# Patient Record
Sex: MALE | Race: BLACK OR AFRICAN AMERICAN | Employment: OTHER | URBAN - METROPOLITAN AREA
[De-identification: names, ages, dates, MRNs, and addresses within clinical notes are randomized per-mention and may not be internally consistent; named-entity substitution may affect disease eponyms.]

---

## 2017-03-31 ENCOUNTER — HOME HEALTH ADMISSION (OUTPATIENT)
Dept: HOME HEALTH SERVICES | Facility: HOME HEALTH | Age: 82
End: 2017-03-31

## 2017-04-01 ENCOUNTER — HOSPITAL ENCOUNTER (OUTPATIENT)
Dept: CT IMAGING | Age: 82
Discharge: HOME | End: 2017-04-01
Attending: INTERNAL MEDICINE
Payer: MEDICARE

## 2017-04-01 DIAGNOSIS — R27.0 ATAXIA: ICD-10-CM

## 2017-04-01 PROCEDURE — 70450 CT HEAD/BRAIN W/O DYE: CPT

## 2017-04-06 ENCOUNTER — HOSPITAL ENCOUNTER (OUTPATIENT)
Dept: GENERAL RADIOLOGY | Age: 82
Discharge: HOME | End: 2017-04-06
Attending: INTERNAL MEDICINE
Payer: MEDICARE

## 2017-04-06 DIAGNOSIS — R63.4 WEIGHT LOSS: ICD-10-CM

## 2017-04-06 PROCEDURE — 71020 XR CHEST PA LAT: CPT

## 2017-05-10 ENCOUNTER — HOSPITAL ENCOUNTER (INPATIENT)
Age: 82
LOS: 8 days | Discharge: NURSING, REHAB OR LONG-TERM CARE | DRG: 682 | End: 2017-05-18
Attending: EMERGENCY MEDICINE | Admitting: INTERNAL MEDICINE
Payer: MEDICARE

## 2017-05-10 ENCOUNTER — APPOINTMENT (OUTPATIENT)
Dept: GENERAL RADIOLOGY | Age: 82
DRG: 682 | End: 2017-05-10
Attending: EMERGENCY MEDICINE
Payer: MEDICARE

## 2017-05-10 ENCOUNTER — APPOINTMENT (OUTPATIENT)
Dept: CT IMAGING | Age: 82
DRG: 682 | End: 2017-05-10
Attending: EMERGENCY MEDICINE
Payer: MEDICARE

## 2017-05-10 ENCOUNTER — APPOINTMENT (OUTPATIENT)
Dept: ULTRASOUND IMAGING | Age: 82
DRG: 682 | End: 2017-05-10
Attending: INTERNAL MEDICINE
Payer: MEDICARE

## 2017-05-10 DIAGNOSIS — R41.82 MENTAL STATUS, DECREASED: ICD-10-CM

## 2017-05-10 DIAGNOSIS — N28.9 ACUTE RENAL INSUFFICIENCY: ICD-10-CM

## 2017-05-10 DIAGNOSIS — E86.0 DEHYDRATION: ICD-10-CM

## 2017-05-10 DIAGNOSIS — N39.0 ACUTE UTI: Primary | ICD-10-CM

## 2017-05-10 LAB
ALBUMIN SERPL BCP-MCNC: 3.4 G/DL (ref 3.4–5)
ALBUMIN/GLOB SERPL: 0.9 {RATIO} (ref 0.8–1.7)
ALP SERPL-CCNC: 77 U/L (ref 45–117)
ALT SERPL-CCNC: 13 U/L (ref 16–61)
ANION GAP BLD CALC-SCNC: 7 MMOL/L (ref 3–18)
APPEARANCE UR: ABNORMAL
APTT PPP: 28.9 SEC (ref 23–36.4)
AST SERPL W P-5'-P-CCNC: 19 U/L (ref 15–37)
BACTERIA URNS QL MICRO: ABNORMAL /HPF
BASOPHILS # BLD AUTO: 0 K/UL (ref 0–0.06)
BASOPHILS # BLD: 0 % (ref 0–2)
BILIRUB SERPL-MCNC: 1.4 MG/DL (ref 0.2–1)
BILIRUB UR QL: ABNORMAL
BUN SERPL-MCNC: 23 MG/DL (ref 7–18)
BUN/CREAT SERPL: 12 (ref 12–20)
CALCIUM SERPL-MCNC: 9.2 MG/DL (ref 8.5–10.1)
CHLORIDE SERPL-SCNC: 106 MMOL/L (ref 100–108)
CK MB CFR SERPL CALC: 1.8 % (ref 0–4)
CK MB SERPL-MCNC: 1.8 NG/ML (ref 5–25)
CK SERPL-CCNC: 99 U/L (ref 39–308)
CO2 SERPL-SCNC: 31 MMOL/L (ref 21–32)
COLOR UR: ABNORMAL
CREAT SERPL-MCNC: 1.97 MG/DL (ref 0.6–1.3)
DIFFERENTIAL METHOD BLD: ABNORMAL
EOSINOPHIL # BLD: 0 K/UL (ref 0–0.4)
EOSINOPHIL NFR BLD: 1 % (ref 0–5)
EPITH CASTS URNS QL MICRO: ABNORMAL /LPF (ref 0–5)
ERYTHROCYTE [DISTWIDTH] IN BLOOD BY AUTOMATED COUNT: 14.2 % (ref 11.6–14.5)
GLOBULIN SER CALC-MCNC: 3.7 G/DL (ref 2–4)
GLUCOSE SERPL-MCNC: 109 MG/DL (ref 74–99)
GLUCOSE UR STRIP.AUTO-MCNC: NEGATIVE MG/DL
HCT VFR BLD AUTO: 39 % (ref 36–48)
HGB BLD-MCNC: 12.6 G/DL (ref 13–16)
HGB UR QL STRIP: ABNORMAL
INR PPP: 2 (ref 0.8–1.2)
KETONES UR QL STRIP.AUTO: ABNORMAL MG/DL
LACTATE BLD-SCNC: 2.4 MMOL/L (ref 0.4–2)
LEUKOCYTE ESTERASE UR QL STRIP.AUTO: ABNORMAL
LYMPHOCYTES # BLD AUTO: 17 % (ref 21–52)
LYMPHOCYTES # BLD: 0.9 K/UL (ref 0.9–3.6)
MCH RBC QN AUTO: 31.4 PG (ref 24–34)
MCHC RBC AUTO-ENTMCNC: 32.3 G/DL (ref 31–37)
MCV RBC AUTO: 97.3 FL (ref 74–97)
MONOCYTES # BLD: 0.5 K/UL (ref 0.05–1.2)
MONOCYTES NFR BLD AUTO: 10 % (ref 3–10)
NEUTS SEG # BLD: 3.9 K/UL (ref 1.8–8)
NEUTS SEG NFR BLD AUTO: 72 % (ref 40–73)
NITRITE UR QL STRIP.AUTO: NEGATIVE
PH UR STRIP: 5 [PH] (ref 5–8)
PLATELET # BLD AUTO: 125 K/UL (ref 135–420)
PMV BLD AUTO: 11.5 FL (ref 9.2–11.8)
POTASSIUM SERPL-SCNC: 3.3 MMOL/L (ref 3.5–5.5)
PROT SERPL-MCNC: 7.1 G/DL (ref 6.4–8.2)
PROT UR STRIP-MCNC: 30 MG/DL
PROTHROMBIN TIME: 21.8 SEC (ref 11.5–15.2)
RBC # BLD AUTO: 4.01 M/UL (ref 4.7–5.5)
RBC #/AREA URNS HPF: >100 /HPF (ref 0–5)
SODIUM SERPL-SCNC: 144 MMOL/L (ref 136–145)
SP GR UR REFRACTOMETRY: 1.02 (ref 1–1.03)
TROPONIN I SERPL-MCNC: 0.04 NG/ML (ref 0–0.04)
UROBILINOGEN UR QL STRIP.AUTO: 2 EU/DL (ref 0.2–1)
WBC # BLD AUTO: 5.4 K/UL (ref 4.6–13.2)
WBC URNS QL MICRO: ABNORMAL /HPF (ref 0–4)

## 2017-05-10 PROCEDURE — 80053 COMPREHEN METABOLIC PANEL: CPT | Performed by: EMERGENCY MEDICINE

## 2017-05-10 PROCEDURE — 85610 PROTHROMBIN TIME: CPT | Performed by: EMERGENCY MEDICINE

## 2017-05-10 PROCEDURE — 71010 XR CHEST PORT: CPT

## 2017-05-10 PROCEDURE — 74011000250 HC RX REV CODE- 250: Performed by: INTERNAL MEDICINE

## 2017-05-10 PROCEDURE — 65270000029 HC RM PRIVATE

## 2017-05-10 PROCEDURE — 74011250636 HC RX REV CODE- 250/636: Performed by: EMERGENCY MEDICINE

## 2017-05-10 PROCEDURE — 51701 INSERT BLADDER CATHETER: CPT

## 2017-05-10 PROCEDURE — 76770 US EXAM ABDO BACK WALL COMP: CPT

## 2017-05-10 PROCEDURE — 85025 COMPLETE CBC W/AUTO DIFF WBC: CPT | Performed by: EMERGENCY MEDICINE

## 2017-05-10 PROCEDURE — 74011250636 HC RX REV CODE- 250/636: Performed by: INTERNAL MEDICINE

## 2017-05-10 PROCEDURE — 96374 THER/PROPH/DIAG INJ IV PUSH: CPT

## 2017-05-10 PROCEDURE — 99284 EMERGENCY DEPT VISIT MOD MDM: CPT

## 2017-05-10 PROCEDURE — 96361 HYDRATE IV INFUSION ADD-ON: CPT

## 2017-05-10 PROCEDURE — 70450 CT HEAD/BRAIN W/O DYE: CPT

## 2017-05-10 PROCEDURE — 81001 URINALYSIS AUTO W/SCOPE: CPT | Performed by: EMERGENCY MEDICINE

## 2017-05-10 PROCEDURE — 82553 CREATINE MB FRACTION: CPT | Performed by: EMERGENCY MEDICINE

## 2017-05-10 PROCEDURE — 77030011943

## 2017-05-10 PROCEDURE — 73030 X-RAY EXAM OF SHOULDER: CPT

## 2017-05-10 PROCEDURE — 83605 ASSAY OF LACTIC ACID: CPT

## 2017-05-10 PROCEDURE — 74011250637 HC RX REV CODE- 250/637: Performed by: INTERNAL MEDICINE

## 2017-05-10 PROCEDURE — 85730 THROMBOPLASTIN TIME PARTIAL: CPT | Performed by: EMERGENCY MEDICINE

## 2017-05-10 PROCEDURE — 87086 URINE CULTURE/COLONY COUNT: CPT | Performed by: EMERGENCY MEDICINE

## 2017-05-10 RX ORDER — CEFTRIAXONE 1 G/1
1 INJECTION, POWDER, FOR SOLUTION INTRAMUSCULAR; INTRAVENOUS
Status: COMPLETED | OUTPATIENT
Start: 2017-05-10 | End: 2017-05-10

## 2017-05-10 RX ORDER — SODIUM CHLORIDE 9 MG/ML
100 INJECTION, SOLUTION INTRAVENOUS CONTINUOUS
Status: DISCONTINUED | OUTPATIENT
Start: 2017-05-10 | End: 2017-05-10

## 2017-05-10 RX ORDER — CARBOXYMETHYLCELLULOSE SODIUM 5 MG/ML
1 SOLUTION/ DROPS OPHTHALMIC DAILY
Status: DISCONTINUED | OUTPATIENT
Start: 2017-05-11 | End: 2017-05-18 | Stop reason: HOSPADM

## 2017-05-10 RX ORDER — POLYETHYLENE GLYCOL 3350 17 G/17G
17 POWDER, FOR SOLUTION ORAL DAILY
Status: DISCONTINUED | OUTPATIENT
Start: 2017-05-11 | End: 2017-05-17

## 2017-05-10 RX ORDER — POTASSIUM CHLORIDE AND SODIUM CHLORIDE 900; 300 MG/100ML; MG/100ML
INJECTION, SOLUTION INTRAVENOUS CONTINUOUS
Status: DISCONTINUED | OUTPATIENT
Start: 2017-05-10 | End: 2017-05-13

## 2017-05-10 RX ORDER — DOCUSATE SODIUM 100 MG/1
100 CAPSULE, LIQUID FILLED ORAL 2 TIMES DAILY
Status: DISCONTINUED | OUTPATIENT
Start: 2017-05-10 | End: 2017-05-17

## 2017-05-10 RX ORDER — WARFARIN SODIUM 5 MG/1
5 TABLET ORAL
Status: DISCONTINUED | OUTPATIENT
Start: 2017-05-10 | End: 2017-05-13

## 2017-05-10 RX ORDER — PANTOPRAZOLE SODIUM 40 MG/1
40 TABLET, DELAYED RELEASE ORAL
Status: DISCONTINUED | OUTPATIENT
Start: 2017-05-11 | End: 2017-05-17

## 2017-05-10 RX ORDER — ASPIRIN 81 MG/1
81 TABLET ORAL DAILY
Status: DISCONTINUED | OUTPATIENT
Start: 2017-05-11 | End: 2017-05-13

## 2017-05-10 RX ORDER — TERAZOSIN 5 MG/1
10 CAPSULE ORAL
Status: DISCONTINUED | OUTPATIENT
Start: 2017-05-10 | End: 2017-05-17

## 2017-05-10 RX ADMIN — WARFARIN SODIUM 5 MG: 5 TABLET ORAL at 21:53

## 2017-05-10 RX ADMIN — BRIMONIDINE TARTRATE 1 DROP: 1 SOLUTION/ DROPS OPHTHALMIC at 22:35

## 2017-05-10 RX ADMIN — CEFTRIAXONE SODIUM 1 G: 1 INJECTION, POWDER, FOR SOLUTION INTRAMUSCULAR; INTRAVENOUS at 16:50

## 2017-05-10 RX ADMIN — SODIUM CHLORIDE 1000 ML: 900 INJECTION, SOLUTION INTRAVENOUS at 16:50

## 2017-05-10 RX ADMIN — SODIUM CHLORIDE AND POTASSIUM CHLORIDE: 9; 2.98 INJECTION, SOLUTION INTRAVENOUS at 19:27

## 2017-05-10 RX ADMIN — TERAZOSIN HYDROCHLORIDE 10 MG: 5 CAPSULE ORAL at 21:55

## 2017-05-10 NOTE — H&P
[address]    Name:  Eva Levin  MR#:  [id number]  :  [id number]  Account #:  [de-identified]  Date of Adm:  05/10/2017      CHIEF COMPLAINT: Altered mental status. HISTORY OF PRESENT ILLNESS: This is a 61-year-old   American male with multiple medical problems relevant for underlying  dementia, chronic kidney disease, sick sinus syndrome, status post  pacemaker with paroxysmal atrial fibrillation on chronic  anticoagulation, among other problems, who was brought to the office  today by his daughter and caretaker because of significant decrease in  his mental status. Apparently, the patient has not been able to get out  of bed, he has been minimally responsive. The patient has had  constipation for a week, finally had a bowel movement the night before  admission. No fever, no chills, no cough, no abdominal pain. He has  been getting his medications and eating some. In the office, the patient  was minimally responsive and felt to need further evaluation. He was  sent to the ER. The patient was found to be dehydrated with acute  kidney injury, urinary tract infection and he was referred for admission. PAST MEDICAL HISTORY  1. Coronary artery disease, status post coronary artery bypass graft. 2. Sick sinus syndrome, status post pacemaker. 3. Paroxysmal atrial fibrillation, on chronic anticoagulation. 4. Chronic kidney disease stage 2+. 5. Hypercholesterolemia. 6. B12 deficiency, recently started on injections. 7. Polyarticular osteoarthritis. 8. Gout. 9. Chronic stable thrombocytopenia. 10. History of gastric ulcers and GI bleed on proton pump inhibitors. 11. Mild chronic anemia. 12. Cardiomyopathy, status post AICD. 13. Benign prostatic hyperplasia. 14. Gastroesophageal reflux disease. ALLERGIES: HE IS ALLERGIC TO SEAFOOD. CURRENT MEDICATIONS  1. B12 1000 mcg IM once a month. 2. Lasix 40 mg daily. 3. Aldactone 25 mg daily.   4. Coumadin 5 mg tablets taken as directed. 5. Hytrin 10 mg at bedtime. 6. Baby aspirin 1 daily. 7. Stool softener daily. 8. Alphagan eye drops to the left eye as directed. 9. Systane/Refresh eyedrops as directed. 10. Norco 5/325 one every day as needed. 11. Colchicine 0.6 mg as needed for gout. 12. Prilosec 20 mg daily. SOCIAL HISTORY: The patient is . He and his wife live with  their daughter. He had 3 children, 1 is . He is a former  smoker and a social alcohol drinker. FAMILY HISTORY: Positive for hypertension. REVIEW OF SYSTEMS: The patient is unable to provide. According to  the daughter, decreased mental status, increased weakness. Right  shoulder pain after a fall recently. Constipation as above, otherwise  negative. PHYSICAL EXAMINATION  GENERAL: Elderly male in no acute distress, minimally responsive. VITAL SIGNS: In the ER, temperature 99, pulse 73, blood pressure  174/91. HEENT: Atraumatic, normocephalic. Sclerae anicteric. NECK: No venous distention or adenopathy. Mucous membranes dry. LUNGS: Equal air entry. Clear to auscultation. HEART: Regular rhythm by auscultation. Systolic ejection murmur 2/6  left parasternal border. ABDOMEN: Soft, nontender, nondistended. EXTREMITIES: No edema. NEUROLOGIC: As above, minimally responsive. LABORATORY DATA: CBC: WBC 5.4, hemoglobin and hematocrit  12.6 and 39.0. Urinalysis: Large blood, moderate bilirubin, small  leukocyte esterase, few epithelial cells, 4-10 WBCs, greater than 100  RBCs and bacteria 3+. Chemistry: Potassium 3.3, BUN 23, creatinine  1.97. Bilirubin 1.4. Cardiac enzymes negative. CT of the head with no  acute abnormality and right shoulder x-ray without acute problems. IMPRESSION  1. Altered mental status. 2. Dehydration. 3. Acute kidney injury. 4. Possible urinary tract infection. 5. Hypokalemia. 6. B12 deficiency. 7. Underlying dementia. 8. As per past medical history.     PLAN: Cultures have been obtained, will obtain renal ultrasound. IV  Rocephin started. Resume usual medications. IV fluids for hydration  and monitor urine output, renal function, etc. Monitor bilirubin, consider  hepatic imaging. Resume all other medications and monitor PT/INR. Further management accordingly.         Nicolle Argueta MD    NT / [facility]  D:  05/10/2017   17:43  T:  05/10/2017   19:38  Job #:  [id number]

## 2017-05-10 NOTE — ED TRIAGE NOTES
Per daughter, patient was walking and alert one month ago, has since become less alert and is no longer able to ambulate on his own.  Responds to pain, patient has upper extremity contractures after being diagnosed with gout about 6 months ago

## 2017-05-10 NOTE — ED PROVIDER NOTES
HPI Comments: 2:43 PM Julia Luke is a 80 y.o. male with history of HTN, CHF, renal insufficiency, and GERD who presents to the ED for evaluation of a fall which happened earlier today in Dr. Gian Miranda office. Patient's caretaker states pt rolled off the bed earlier and was suggested to visit the ED for evaluation. 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 PCP: Leodan Son MD      The history is provided by a relative and a caregiver. Past Medical History:   Diagnosis Date    Atrial fibrillation (HCC)     Benign prostate hyperplasia     Cellulitis     Lower extremity    Chronic renal insufficiency     Congestive heart failure (CHF) (HCC)     GERD (gastroesophageal reflux disease)     Glaucoma     Hypertension     Inguinal hernia     Klebsiella pneumonia (HCC)     Pleural effusion, left     Sepsis ([address]     Venous insufficiency     Lower extremity       Past Surgical History:   Procedure Laterality Date    CARDIAC SURG PROCEDURE UNLIST      HX PACEMAKER           History reviewed. No pertinent family history. Social History     Social History    Marital status:      Spouse name: N/A    Number of children: N/A    Years of education: N/A     Occupational History    Not on file. Social History Main Topics    Smoking status: Former Smoker    Smokeless tobacco: Not on file    Alcohol use Yes      Comment: occ    Drug use: No    Sexual activity: Not on file     Other Topics Concern    Not on file     Social History Narrative         ALLERGIES: Seafood    Review of Systems   Unable to perform ROS: Patient nonverbal       Vitals:    05/10/17 1509   BP: 137/85   Pulse: 70   Resp: 22   Temp: 99 °F (37.2 °C)   SpO2: 98%   Weight: 85.3 kg (188 lb)   Height: 6' (1.829 m)            Physical Exam   Constitutional: He is oriented to person, place, and time. He appears well-developed and well-nourished.  No distress. HENT:   Head: Normocephalic and atraumatic. Mouth/Throat: Oropharynx is clear and moist.   Eyes: Conjunctivae and EOM are normal. Pupils are equal, round, and reactive to light. No scleral icterus. Neck: Normal range of motion. Neck supple. Cardiovascular: Normal rate, regular rhythm and normal heart sounds. No murmur heard. Pacemaker left subclavian. Pulmonary/Chest: Effort normal and breath sounds normal. No respiratory distress. Abdominal: Soft. Bowel sounds are normal. He exhibits no distension. There is no tenderness. Genitourinary: Rectal exam shows guaiac negative stool. Musculoskeletal: He exhibits no edema. Mild contraction L elbow. Gouty changes R MCP. Lymphadenopathy:     He has no cervical adenopathy. Neurological: He is alert and oriented to person, place, and time. Coordination normal. GCS verbal subscore is 2. Skin: Skin is warm and dry. No rash noted. Psychiatric: He has a normal mood and affect. His behavior is normal.   Nursing note and vitals reviewed. MDM  Number of Diagnoses or Management Options  Acute renal insufficiency:   Acute UTI:   Dehydration:   Mental status, decreased:   Diagnosis management comments: Referred to ED for dehydration altered mental status and hypotension Bp improved with fluids labs reviewed ct neg for acute changes    ua cultures rocephin started    Discussed with DR Stewart Se will admit    r shoulder ?  Old fracture does not appear acute will await formal reading     ED Course       Procedures  Vitals:  Patient Vitals for the past 12 hrs:   Temp Pulse Resp BP SpO2   05/10/17 1509 99 °F (37.2 °C) 70 22 137/85 98 %        Medications ordered:   Medications   sodium chloride 0.9 % bolus infusion 1,000 mL (1,000 mL IntraVENous New Bag 5/10/17 1650)   cefTRIAXone (ROCEPHIN) injection 1 g (1 g IntraVENous Given 5/10/17 1650)         Lab findings:  Recent Results (from the past 12 hour(s))   URINALYSIS W/ RFLX MICROSCOPIC Collection Time: 05/10/17  3:05 PM   Result Value Ref Range    Color DARK YELLOW      Appearance CLOUDY      Specific gravity 1.020 1.005 - 1.030      pH (UA) 5.0 5.0 - 8.0      Protein 30 (A) NEG mg/dL    Glucose NEGATIVE  NEG mg/dL    Ketone TRACE (A) NEG mg/dL    Bilirubin MODERATE (A) NEG      Blood LARGE (A) NEG      Urobilinogen 2.0 (H) 0.2 - 1.0 EU/dL    Nitrites NEGATIVE  NEG      Leukocyte Esterase SMALL (A) NEG     URINE MICROSCOPIC ONLY    Collection Time: 05/10/17  3:05 PM   Result Value Ref Range    WBC 4 to 10 0 - 4 /hpf    RBC >100 (H) 0 - 5 /hpf    Epithelial cells FEW 0 - 5 /lpf    Bacteria 3+ (A) NEG /hpf   POC LACTIC ACID    Collection Time: 05/10/17  3:29 PM   Result Value Ref Range    Lactic Acid (POC) 2.4 (HH) 0.4 - 2.0 mmol/L   CBC WITH AUTOMATED DIFF    Collection Time: 05/10/17  3:30 PM   Result Value Ref Range    WBC 5.4 4.6 - 13.2 K/uL    RBC 4.01 (L) 4.70 - 5.50 M/uL    HGB 12.6 (L) 13.0 - 16.0 g/dL    HCT 39.0 36.0 - 48.0 %    MCV 97.3 (H) 74.0 - 97.0 FL    MCH 31.4 24.0 - 34.0 PG    MCHC 32.3 31.0 - 37.0 g/dL    RDW 14.2 11.6 - 14.5 %    PLATELET 791 (L) 975 - 420 K/uL    MPV 11.5 9.2 - 11.8 FL    NEUTROPHILS 72 40 - 73 %    LYMPHOCYTES 17 (L) 21 - 52 %    MONOCYTES 10 3 - 10 %    EOSINOPHILS 1 0 - 5 %    BASOPHILS 0 0 - 2 %    ABS. NEUTROPHILS 3.9 1.8 - 8.0 K/UL    ABS. LYMPHOCYTES 0.9 0.9 - 3.6 K/UL    ABS. MONOCYTES 0.5 0.05 - 1.2 K/UL    ABS. EOSINOPHILS 0.0 0.0 - 0.4 K/UL    ABS.  BASOPHILS 0.0 0.0 - 0.06 K/UL    DF AUTOMATED     METABOLIC PANEL, COMPREHENSIVE    Collection Time: 05/10/17  3:30 PM   Result Value Ref Range    Sodium 144 136 - 145 mmol/L    Potassium 3.3 (L) 3.5 - 5.5 mmol/L    Chloride 106 100 - 108 mmol/L    CO2 31 21 - 32 mmol/L    Anion gap 7 3.0 - 18 mmol/L    Glucose 109 (H) 74 - 99 mg/dL    BUN 23 (H) 7.0 - 18 MG/DL    Creatinine 1.97 (H) 0.6 - 1.3 MG/DL    BUN/Creatinine ratio 12 12 - 20      GFR est AA 39 (L) >60 ml/min/1.73m2    GFR est non-AA 32 (L) >60 ml/min/1.73m2    Calcium 9.2 8.5 - 10.1 MG/DL    Bilirubin, total 1.4 (H) 0.2 - 1.0 MG/DL    ALT (SGPT) 13 (L) 16 - 61 U/L    AST (SGOT) 19 15 - 37 U/L    Alk. phosphatase 77 45 - 117 U/L    Protein, total 7.1 6.4 - 8.2 g/dL    Albumin 3.4 3.4 - 5.0 g/dL    Globulin 3.7 2.0 - 4.0 g/dL    A-G Ratio 0.9 0.8 - 1.7     CARDIAC PANEL,(CK, CKMB & TROPONIN)    Collection Time: 05/10/17  3:30 PM   Result Value Ref Range    CK 99 39 - 308 U/L    CK - MB 1.8 <3.6 ng/ml    CK-MB Index 1.8 0.0 - 4.0 %    Troponin-I, Qt. 0.04 0.0 - 0.045 NG/ML   PTT    Collection Time: 05/10/17  3:30 PM   Result Value Ref Range    aPTT 28.9 23.0 - 36.4 SEC   PROTHROMBIN TIME + INR    Collection Time: 05/10/17  3:30 PM   Result Value Ref Range    Prothrombin time 21.8 (H) 11.5 - 15.2 sec    INR 2.0 (H) 0.8 - 1.2         EKG interpretation by ED Physician:       X-Ray, CT or other radiology findings or impressions:  CT HEAD WO CONT   Final Result      XR SHOULDER RT AP/LAT MIN 2 V   Final Result      XR CHEST PORT   Final Result          Orders:  Orders Placed This Encounter    CULTURE, URINE     Standing Status:   Standing     Number of Occurrences:   1     Order Specific Question:   Reason for Culture     Answer:   Eval of sepsis without a clear source    CT HEAD WO CONT     Standing Status:   Standing     Number of Occurrences:   1     Order Specific Question:   Transport     Answer:   Stretcher [5]     Order Specific Question:   Is Patient Allergic to Contrast Dye?      Answer:   No    XR SHOULDER RT AP/LAT MIN 2 V     Standing Status:   Standing     Number of Occurrences:   1     Order Specific Question:   Transport     Answer:   Stretcher [5]     Order Specific Question:   Reason for Exam     Answer:   trauma    XR CHEST PORT     Standing Status:   Standing     Number of Occurrences:   1     Order Specific Question:   Reason for Exam     Answer:   altered mental status    CBC WITH AUTOMATED DIFF     Standing Status:   Standing     Number of Occurrences:   1    METABOLIC PANEL, COMPREHENSIVE     Standing Status:   Standing     Number of Occurrences:   1    CARDIAC PANEL,(CK, CKMB & TROPONIN)     Standing Status:   Standing     Number of Occurrences:   1    URINALYSIS W/ RFLX MICROSCOPIC     Standing Status:   Standing     Number of Occurrences:   1    PTT     Standing Status:   Standing     Number of Occurrences:   1    PROTHROMBIN TIME + INR     Standing Status:   Standing     Number of Occurrences:   1    URINE MICROSCOPIC ONLY     Standing Status:   Standing     Number of Occurrences:   1    POC LACTIC ACID     Standing Status:   Standing     Number of Occurrences:   1    STRAIGHT CATHETER (NURSING) ONE TIME STAT     Standing Status:   Standing     Number of Occurrences:   1    POC LACTIC ACID     Standing Status:   Standing     Number of Occurrences:   1    sodium chloride 0.9 % bolus infusion 1,000 mL    cefTRIAXone (ROCEPHIN) injection 1 g     Order Specific Question:   Antibiotic Indications     Answer:   Urinary Tract Infection    INITIAL PHYSICIAN ORDER: INPATIENT Medical; 3. Patient receiving treatment that can only be provided in an inpatient setting (further clarification in H&P documentation)     Standing Status:   Standing     Number of Occurrences:   1     Order Specific Question:   Status: Answer:   Inpatient [101]     Order Specific Question:   Type of Bed     Answer:   Medical [8]     Order Specific Question:   Inpatient Hospitalization Certified Necessary for the Following Reasons     Answer:   3.  Patient receiving treatment that can only be provided in an inpatient setting (further clarification in H&P documentation)     Order Specific Question:   Admitting Diagnosis     Answer:   UTI (urinary tract infection) [[id number]]     Order Specific Question:   Admitting Physician     Answer:   Bibiana Lyons     Order Specific Question:   Attending Physician     Answer:   Bibiana Magic     Order Specific Question:   Estimated Length of Stay     Answer:   > or = to 2 Midnights     Order Specific Question:   Discharge Plan:     Answer:   Home with Office Follow-up       Reevaluation, Progress notes, Consult notes, or additional Procedure notes:       Disposition:  Diagnosis:   1. Acute UTI    2. Dehydration    3. Mental status, decreased    4. Acute renal insufficiency        Disposition:     Follow-up Information     None           Patient's Medications   Start Taking    No medications on file   Continue Taking    AMMONIUM LACTATE (LAC-HYDRIN) 12 % LOTION    rub in to affected area well  Indications: DRY SKIN    ASPIRIN DELAYED-RELEASE 81 MG TABLET    Take 81 mg by mouth daily. BRIMONIDINE (ALPHAGAN P) 0.1 % OPHTHALMIC SOLUTION    Administer 1 Drop to left eye every evening. CAPSICUM OLEORESIN 0.025 % TOPICAL CREAM    Apply  to affected area three (3) times daily. CARBOXYMETHYLCELLULOSE SODIUM (REFRESH LIQUIGEL) 1 % DLGL    Apply 1 Drop to eye daily. Indications: 1 drop in R eye    COLCHICINE 0.6 MG TABLET    Take 1 Tab by mouth every Monday, Wednesday, Friday. DOCUSATE SODIUM (COLACE) 100 MG CAPSULE    Take 100 mg by mouth two (2) times a day. FUROSEMIDE (LASIX) 40 MG TABLET     1 daily    HYDROCODONE-ACETAMINOPHEN (NORCO) 5-325 MG PER TABLET    Take 1 Tab by mouth every six (6) hours as needed. Max Daily Amount: 4 Tabs. PANTOPRAZOLE (PROTONIX) 40 MG TABLET    Take 1 Tab by mouth daily (with breakfast). SPIRONOLACTONE (ALDACTONE) 25 MG TABLET    Take 1 Tab by mouth two (2) times a day. TERAZOSIN (HYTRIN) 10 MG CAPSULE    Take 1 Cap by mouth nightly. WARFARIN (COUMADIN) 5 MG TABLET    Take 0.5 Tabs by mouth daily.    These Medications have changed    No medications on file   Stop Taking    No medications on file         Scribe Attestation  Ruby Alfaro scribing for and in the presence of Madeleine Thompson MD (05/10/17)      Physician Attestation  I personally performed the services described in this documentation, reviewed and edited the documentation which was dictated to the scribe in my presence, and it accurately records my words and actions.     Eloy Hayes MD (05/10/17)      Signed by: Maeve Muhammad, May 10, 2017 at 3:00 PM

## 2017-05-11 PROBLEM — N17.9 AKI (ACUTE KIDNEY INJURY) (HCC): Status: ACTIVE | Noted: 2017-05-11

## 2017-05-11 PROBLEM — J18.9 PNEUMONIA: Status: ACTIVE | Noted: 2017-05-11

## 2017-05-11 PROBLEM — I49.5 SSS (SICK SINUS SYNDROME) (HCC): Status: ACTIVE | Noted: 2017-05-11

## 2017-05-11 PROBLEM — G93.41 METABOLIC ENCEPHALOPATHY: Status: ACTIVE | Noted: 2017-05-11

## 2017-05-11 PROBLEM — E86.0 DEHYDRATION: Status: ACTIVE | Noted: 2017-05-11

## 2017-05-11 LAB
ALBUMIN SERPL BCP-MCNC: 3.1 G/DL (ref 3.4–5)
ALBUMIN/GLOB SERPL: 0.9 {RATIO} (ref 0.8–1.7)
ALP SERPL-CCNC: 73 U/L (ref 45–117)
ALT SERPL-CCNC: 11 U/L (ref 16–61)
ANION GAP BLD CALC-SCNC: 8 MMOL/L (ref 3–18)
AST SERPL W P-5'-P-CCNC: 17 U/L (ref 15–37)
BILIRUB SERPL-MCNC: 0.9 MG/DL (ref 0.2–1)
BUN SERPL-MCNC: 20 MG/DL (ref 7–18)
BUN/CREAT SERPL: 13 (ref 12–20)
CALCIUM SERPL-MCNC: 8.7 MG/DL (ref 8.5–10.1)
CHLORIDE SERPL-SCNC: 110 MMOL/L (ref 100–108)
CO2 SERPL-SCNC: 28 MMOL/L (ref 21–32)
CREAT SERPL-MCNC: 1.6 MG/DL (ref 0.6–1.3)
GLOBULIN SER CALC-MCNC: 3.4 G/DL (ref 2–4)
GLUCOSE SERPL-MCNC: 97 MG/DL (ref 74–99)
INR PPP: 2.1 (ref 0.8–1.2)
POTASSIUM SERPL-SCNC: 3.6 MMOL/L (ref 3.5–5.5)
PROT SERPL-MCNC: 6.5 G/DL (ref 6.4–8.2)
PROTHROMBIN TIME: 22.5 SEC (ref 11.5–15.2)
SODIUM SERPL-SCNC: 146 MMOL/L (ref 136–145)

## 2017-05-11 PROCEDURE — 74011000258 HC RX REV CODE- 258: Performed by: INTERNAL MEDICINE

## 2017-05-11 PROCEDURE — 74011250637 HC RX REV CODE- 250/637: Performed by: INTERNAL MEDICINE

## 2017-05-11 PROCEDURE — 80053 COMPREHEN METABOLIC PANEL: CPT | Performed by: INTERNAL MEDICINE

## 2017-05-11 PROCEDURE — 65270000029 HC RM PRIVATE

## 2017-05-11 PROCEDURE — 74011250636 HC RX REV CODE- 250/636: Performed by: INTERNAL MEDICINE

## 2017-05-11 PROCEDURE — 36415 COLL VENOUS BLD VENIPUNCTURE: CPT | Performed by: INTERNAL MEDICINE

## 2017-05-11 PROCEDURE — 85610 PROTHROMBIN TIME: CPT | Performed by: INTERNAL MEDICINE

## 2017-05-11 RX ORDER — LEVOFLOXACIN 5 MG/ML
250 INJECTION, SOLUTION INTRAVENOUS EVERY 24 HOURS
Status: DISCONTINUED | OUTPATIENT
Start: 2017-05-12 | End: 2017-05-16

## 2017-05-11 RX ORDER — LEVOFLOXACIN 5 MG/ML
500 INJECTION, SOLUTION INTRAVENOUS ONCE
Status: COMPLETED | OUTPATIENT
Start: 2017-05-11 | End: 2017-05-15

## 2017-05-11 RX ADMIN — CARBOXYMETHYLCELLULOSE SODIUM 1 DROP: 5 SOLUTION/ DROPS OPHTHALMIC at 09:00

## 2017-05-11 RX ADMIN — DOCUSATE SODIUM 100 MG: 100 CAPSULE, LIQUID FILLED ORAL at 09:41

## 2017-05-11 RX ADMIN — ASPIRIN 81 MG: 81 TABLET, COATED ORAL at 09:41

## 2017-05-11 RX ADMIN — PIPERACILLIN SODIUM,TAZOBACTAM SODIUM 3.38 G: 3; .375 INJECTION, POWDER, FOR SOLUTION INTRAVENOUS at 16:00

## 2017-05-11 RX ADMIN — SODIUM CHLORIDE AND POTASSIUM CHLORIDE: 9; 2.98 INJECTION, SOLUTION INTRAVENOUS at 06:02

## 2017-05-11 RX ADMIN — POLYETHYLENE GLYCOL 3350 17 G: 17 POWDER, FOR SOLUTION ORAL at 09:00

## 2017-05-11 RX ADMIN — PANTOPRAZOLE SODIUM 40 MG: 40 TABLET, DELAYED RELEASE ORAL at 09:41

## 2017-05-11 RX ADMIN — DOCUSATE SODIUM 100 MG: 100 CAPSULE, LIQUID FILLED ORAL at 18:00

## 2017-05-11 RX ADMIN — TERAZOSIN HYDROCHLORIDE 10 MG: 5 CAPSULE ORAL at 23:08

## 2017-05-11 RX ADMIN — BRIMONIDINE TARTRATE 1 DROP: 1 SOLUTION/ DROPS OPHTHALMIC at 18:00

## 2017-05-11 RX ADMIN — WARFARIN SODIUM 5 MG: 5 TABLET ORAL at 23:09

## 2017-05-11 RX ADMIN — LEVOFLOXACIN 500 MG: 5 INJECTION, SOLUTION INTRAVENOUS at 18:31

## 2017-05-11 NOTE — CDMP QUERY
After review of this pt's Cxr, please document in your progress if you feel that this pt meets criteria for     =>Pneumonia  =>Other Explanation of clinical findings  =>Unable to Determine (no explanation of clinical findings)    The medical record reflects the following:    Risk:  80 yom  who present with ams, bayron, pt is non-verbal, hx of falls     Clinical Indicators: chest xrays shows ? acute right upper lobe infiltrate     Treatment:   abx     Please clarify and document your clinical opinion in the progress notes and discharge summary including the definitive and/or presumptive diagnosis, (suspected or probable), related to the above clinical findings. Please include clinical findings supporting your diagnosis. If you DECLINE this query or would like to communicate with [employer], please utilize the \"[employer] message box\" at the TOP of the Progress Note on the right.       Thank you,  Sreedhar Conner [phone]

## 2017-05-11 NOTE — CDMP QUERY
Please clarify if this patient is being treated/managed for:    =>Metabolic Encephalopathy sec to Hasmukh and Uti and dehydration   =>Other Explanation of clinical findings  =>Unable to Determine (no explanation of clinical findings)    The medical record reflects the following:    Risk:  80 yom with hx of ckd,     Clinical Indicators: non-verbal on admission, dehydration, hasmukh, uti     Treatment:  abx, ivf, renal u/s     Please clarify and document your clinical opinion in the progress notes and discharge summary including the definitive and/or presumptive diagnosis, (suspected or probable), related to the above clinical findings. Please include clinical findings supporting your diagnosis. If you DECLINE this query or would like to communicate with [employer], please utilize the \"[employer] message box\" at the TOP of the Progress Note on the right.       Thank you,  Marquise Eugene [phone]

## 2017-05-11 NOTE — PROGRESS NOTES
Reason for Renal Dosing:  Per Renal Dosing Policy    Patient clinical status and labs ordered/reviewed. Pt Weight Weight: 85.3 kg (188 lb)   Serum Creatinine Lab Results   Component Value Date/Time    Creatinine 1.60 05/11/2017 07:45 AM       Creatinine Clearance Estimated Creatinine Clearance: 31.7 mL/min (based on Cr of 1.6). BUN Lab Results   Component Value Date/Time    BUN 20 05/11/2017 07:45 AM       WBC Lab Results   Component Value Date/Time    WBC 5.4 05/10/2017 03:30 PM      Temperature Temp: 98.4 °F (36.9 °C)   HR Pulse (Heart Rate): 69     BP BP: 133/79           Drug type: Antibiotic indicated for Pneumonia (CAP)    Drug/dose: Piperacillin-Tazobactam 3.375 grams every 6 hours was adjusted to Piperacillin-Tazobactam 3.375 grams every 8 hours JESE     Continue to monitor. Signed Coleman Sanabria PHARMD  Date 5/11/2017  Time 3:52 PM    Reason for Renal Dosing:  Per Renal Dosing Policy    Patient clinical status and labs ordered/reviewed. Pt Weight Weight: 85.3 kg (188 lb)   Serum Creatinine Lab Results   Component Value Date/Time    Creatinine 1.60 05/11/2017 07:45 AM       Creatinine Clearance Estimated Creatinine Clearance: 31.7 mL/min (based on Cr of 1.6). BUN Lab Results   Component Value Date/Time    BUN 20 05/11/2017 07:45 AM       WBC Lab Results   Component Value Date/Time    WBC 5.4 05/10/2017 03:30 PM      Temperature Temp: 98.4 °F (36.9 °C)   HR Pulse (Heart Rate): 69     BP BP: 133/79           Drug type: Antibiotic indicated for CAP, Urinary Tract Infection    Drug/dose: Levofloxacin 500 mg every 24 hours was adjusted to Levofloxacin 500 once, then 250 mg every 24 hours following day. Continue to monitor.     Signed Coleman Sanabria, PHARMD  Date 5/11/2017  Time 4:07 PM

## 2017-05-11 NOTE — ROUTINE PROCESS
1958  Assumed care of sleeping black male. Family at bedside. Shift assessment completed. Pt asleep. Daughter states that pt is non-verbal, hx of gout to right hand and gets irritated if touched on the right hand. IVF infusing, no signs of distress noted at this time. Allergy and fall risk band applied to left wrist. Will continue to monitor. 0730  Bedside and Verbal shift change report given to Mejia LYNN RN(oncoming nurse) by Ever MOON Rn (offgoing nurse). Report included the following information SBAR, Kardex, Intake/Output and MAR. Daughter at bedside.

## 2017-05-11 NOTE — PROGRESS NOTES
General Internal Medicine/Geriatrics    Patient: Edd Faustin MRN: [id number]  CSN: [id number]    YOB: 1923  Age: 80 y.o. Sex: male    DOA: 5/10/2017 LOS:  LOS: 1 day                    Subjective:     More alert, verbal  Not back to baseline  No cough  No pain    Review of Systems:  Eyes: negative  Ears, Nose, Mouth, Throat, and Face: negative  Respiratory: negative for dyspnea on exertion  Cardiovascular: negative for chest pain  Gastrointestinal: negative for nausea, vomiting and diarrhea  Genitourinary:negative for dysuria and hematuria  Integument/Breast: negative  Hematologic/Lymphatic: negative for bleeding  Musculoskeletal:negative for arthralgias  Neurological: negative for weakness  Behavioral/Psychiatric: negative for behavior problems  Endocrine: negative for temperature intolerance  Allergic/Immunologic: negative for hay fever    Objective:     Physical Exam:  Patient Vitals for the past 24 hrs:   Temp Pulse Resp BP SpO2   05/11/17 0711 98.1 °F (36.7 °C) 71 16 (!) 169/98 -   05/11/17 0144 97.7 °F (36.5 °C) 70 16 150/90 100 %   05/11/17 0127 - - - (!) 153/91 -   05/10/17 2023 97.9 °F (36.6 °C) 86 16 144/83 -   05/10/17 1922 97.9 °F (36.6 °C) 70 16 115/79 -   05/10/17 1850 - 70 12 112/73 99 %   05/10/17 1830 - 70 16 126/82 99 %   05/10/17 1720 - 73 14 (!) 174/91 100 %   05/10/17 1620 - 71 15 110/67 99 %     AF  BP up    HEENT: wnl  NECK:  No venous distention or adenopathy   HEART: RRR, no rubs or gallops  LUNGS: Clear to auscultation  ABDOMEN: soft with active BS.  No tenderness, no distention, no organomegaly  EXT: warm,no edema  SKIN: Normal turgor, no breaks  NEURO: Awake, alert, non focal    Intake and Output:  Current Shift:     Last three shifts:  05/09 1901 - 05/11 0700  In: 1153.3 [I.V.:1153.3]  Out: -     Recent Results (from the past 24 hour(s))   METABOLIC PANEL, COMPREHENSIVE    Collection Time: 05/11/17  7:45 AM   Result Value Ref Range    Sodium 146 (H) 136 - 145 mmol/L    Potassium 3.6 3.5 - 5.5 mmol/L    Chloride 110 (H) 100 - 108 mmol/L    CO2 28 21 - 32 mmol/L    Anion gap 8 3.0 - 18 mmol/L    Glucose 97 74 - 99 mg/dL    BUN 20 (H) 7.0 - 18 MG/DL    Creatinine 1.60 (H) 0.6 - 1.3 MG/DL    BUN/Creatinine ratio 13 12 - 20      GFR est AA 49 (L) >60 ml/min/1.73m2    GFR est non-AA 41 (L) >60 ml/min/1.73m2    Calcium 8.7 8.5 - 10.1 MG/DL    Bilirubin, total 0.9 0.2 - 1.0 MG/DL    ALT (SGPT) 11 (L) 16 - 61 U/L    AST (SGOT) 17 15 - 37 U/L    Alk.  phosphatase 73 45 - 117 U/L    Protein, total 6.5 6.4 - 8.2 g/dL    Albumin 3.1 (L) 3.4 - 5.0 g/dL    Globulin 3.4 2.0 - 4.0 g/dL    A-G Ratio 0.9 0.8 - 1.7     PROTHROMBIN TIME + INR    Collection Time: 05/11/17  7:45 AM   Result Value Ref Range    Prothrombin time 22.5 (H) 11.5 - 15.2 sec    INR 2.1 (H) 0.8 - 1.2             Current Facility-Administered Medications   Medication Dose Route Frequency    levoFLOXacin (LEVAQUIN) 500 mg in D5W IVPB  500 mg IntraVENous Q24H    piperacillin-tazobactam (ZOSYN) 3.375 g in 0.9% sodium chloride (MBP/ADV) 100 mL MBP  3.375 g IntraVENous Q6H    warfarin (COUMADIN) tablet 5 mg  5 mg Oral QHS    terazosin (HYTRIN) capsule 10 mg  10 mg Oral QHS    docusate sodium (COLACE) capsule 100 mg  100 mg Oral BID    polyethylene glycol (MIRALAX) packet 17 g  17 g Oral DAILY    pantoprazole (PROTONIX) tablet 40 mg  40 mg Oral ACB    aspirin delayed-release tablet 81 mg  81 mg Oral DAILY    brimonidine (ALPHAGAN P) 0.1 % ophthalmic solution 1 Drop  1 Drop Left Eye QPM    carboxymethylcellulose sodium (CELLUVISC) 0.5 % ophthalmic solution 1 Drop  1 Drop Right Eye DAILY    0.9% sodium chloride with KCl 40 mEq/L infusion   IntraVENous CONTINUOUS       Lab Results   Component Value Date/Time    Glucose 97 05/11/2017 07:45 AM    Glucose 109 05/10/2017 03:30 PM    Glucose 83 10/07/2016 03:40 AM    Glucose 80 10/06/2016 03:50 AM    Glucose 127 10/05/2016 04:25 AM        Assessment     Active Problems: UTI (urinary tract infection) (5/10/2017)      Pneumonia (5/11/2017)      VINCENT (acute kidney injury) ([facility] [address] (0/16/9133)      Metabolic encephalopathy (6/35/4175)      SSS (sick sinus syndrome) ([facility] (5/11/2017)      Dehydration (5/11/2017)        Plan     MS improved  Renal parameters better  CXR ? RUL pneumonia  Urine C&S pending  Renal US , chronic medical disease, no hydronephrosis  Zosyn and Levaquin  Monitor renal function  Monitor INR        Leopoldo Fend, MD  5/11/2017, 3:31 PM

## 2017-05-11 NOTE — ACP (ADVANCE CARE PLANNING)
Patient has designated ___unable to verbalize_____________________ to participate in his/her discharge plan and to receive any needed information.      Name: Daughter and wife are next of kin: Adrienne Lacy: [phone]/ Tianna Mcpherson: [phone]  Address: same as pt

## 2017-05-11 NOTE — PROGRESS NOTES
visited with the family of Chan Perry, who is a 80 y. o.,male. The  provided the following Interventions:  Initiated a relationship of care and support. Offered prayer and assurance of continued prayers on patients behalf. Plan:  Chaplains will continue to follow and will provide pastoral care on an as needed/requested basis.  recommends bedside caregivers page  on duty if patient shows signs of acute spiritual or emotional distress. Patient is unable to communicate at this time.  offered prayer and left Spiritual Care brochure. Chaplains will continue to follow and will provide pastoral care on an as needed/requested basis.     [address] [facility]   Staff [facility]   [phone]

## 2017-05-12 LAB
ALBUMIN SERPL BCP-MCNC: 3 G/DL (ref 3.4–5)
ALBUMIN/GLOB SERPL: 0.9 {RATIO} (ref 0.8–1.7)
ALP SERPL-CCNC: 69 U/L (ref 45–117)
ALT SERPL-CCNC: 10 U/L (ref 16–61)
ANION GAP BLD CALC-SCNC: 7 MMOL/L (ref 3–18)
AST SERPL W P-5'-P-CCNC: 16 U/L (ref 15–37)
BACTERIA SPEC CULT: NORMAL
BILIRUB SERPL-MCNC: 0.8 MG/DL (ref 0.2–1)
BUN SERPL-MCNC: 17 MG/DL (ref 7–18)
BUN/CREAT SERPL: 11 (ref 12–20)
CALCIUM SERPL-MCNC: 8.7 MG/DL (ref 8.5–10.1)
CHLORIDE SERPL-SCNC: 112 MMOL/L (ref 100–108)
CO2 SERPL-SCNC: 26 MMOL/L (ref 21–32)
CREAT SERPL-MCNC: 1.57 MG/DL (ref 0.6–1.3)
GLOBULIN SER CALC-MCNC: 3.2 G/DL (ref 2–4)
GLUCOSE SERPL-MCNC: 101 MG/DL (ref 74–99)
INR PPP: 2.5 (ref 0.8–1.2)
POTASSIUM SERPL-SCNC: 3.7 MMOL/L (ref 3.5–5.5)
PROT SERPL-MCNC: 6.2 G/DL (ref 6.4–8.2)
PROTHROMBIN TIME: 25.5 SEC (ref 11.5–15.2)
SERVICE CMNT-IMP: NORMAL
SODIUM SERPL-SCNC: 145 MMOL/L (ref 136–145)

## 2017-05-12 PROCEDURE — 65270000029 HC RM PRIVATE

## 2017-05-12 PROCEDURE — 74011250637 HC RX REV CODE- 250/637: Performed by: INTERNAL MEDICINE

## 2017-05-12 PROCEDURE — 85610 PROTHROMBIN TIME: CPT | Performed by: INTERNAL MEDICINE

## 2017-05-12 PROCEDURE — 97110 THERAPEUTIC EXERCISES: CPT

## 2017-05-12 PROCEDURE — 74011000258 HC RX REV CODE- 258: Performed by: INTERNAL MEDICINE

## 2017-05-12 PROCEDURE — 74011250636 HC RX REV CODE- 250/636: Performed by: INTERNAL MEDICINE

## 2017-05-12 PROCEDURE — 36415 COLL VENOUS BLD VENIPUNCTURE: CPT | Performed by: INTERNAL MEDICINE

## 2017-05-12 PROCEDURE — 74011000250 HC RX REV CODE- 250: Performed by: INTERNAL MEDICINE

## 2017-05-12 PROCEDURE — 97166 OT EVAL MOD COMPLEX 45 MIN: CPT

## 2017-05-12 PROCEDURE — 80053 COMPREHEN METABOLIC PANEL: CPT | Performed by: INTERNAL MEDICINE

## 2017-05-12 RX ADMIN — SODIUM CHLORIDE AND POTASSIUM CHLORIDE: 9; 2.98 INJECTION, SOLUTION INTRAVENOUS at 20:30

## 2017-05-12 RX ADMIN — PIPERACILLIN SODIUM,TAZOBACTAM SODIUM 3.38 G: 3; .375 INJECTION, POWDER, FOR SOLUTION INTRAVENOUS at 16:24

## 2017-05-12 RX ADMIN — DOCUSATE SODIUM 100 MG: 100 CAPSULE, LIQUID FILLED ORAL at 09:20

## 2017-05-12 RX ADMIN — LEVOFLOXACIN 250 MG: 5 INJECTION, SOLUTION INTRAVENOUS at 16:23

## 2017-05-12 RX ADMIN — PANTOPRAZOLE SODIUM 40 MG: 40 TABLET, DELAYED RELEASE ORAL at 09:20

## 2017-05-12 RX ADMIN — BRIMONIDINE TARTRATE 1 DROP: 1 SOLUTION/ DROPS OPHTHALMIC at 23:44

## 2017-05-12 RX ADMIN — TERAZOSIN HYDROCHLORIDE 10 MG: 5 CAPSULE ORAL at 23:31

## 2017-05-12 RX ADMIN — SODIUM CHLORIDE AND POTASSIUM CHLORIDE: 9; 2.98 INJECTION, SOLUTION INTRAVENOUS at 02:57

## 2017-05-12 RX ADMIN — CARBOXYMETHYLCELLULOSE SODIUM 1 DROP: 5 SOLUTION/ DROPS OPHTHALMIC at 09:21

## 2017-05-12 RX ADMIN — WARFARIN SODIUM 5 MG: 5 TABLET ORAL at 23:31

## 2017-05-12 RX ADMIN — PIPERACILLIN SODIUM,TAZOBACTAM SODIUM 3.38 G: 3; .375 INJECTION, POWDER, FOR SOLUTION INTRAVENOUS at 09:20

## 2017-05-12 RX ADMIN — PIPERACILLIN SODIUM,TAZOBACTAM SODIUM 3.38 G: 3; .375 INJECTION, POWDER, FOR SOLUTION INTRAVENOUS at 23:31

## 2017-05-12 RX ADMIN — POLYETHYLENE GLYCOL 3350 17 G: 17 POWDER, FOR SOLUTION ORAL at 09:20

## 2017-05-12 RX ADMIN — PIPERACILLIN SODIUM,TAZOBACTAM SODIUM 3.38 G: 3; .375 INJECTION, POWDER, FOR SOLUTION INTRAVENOUS at 00:36

## 2017-05-12 RX ADMIN — ASPIRIN 81 MG: 81 TABLET, COATED ORAL at 09:20

## 2017-05-12 NOTE — PROGRESS NOTES
General Internal Medicine/Geriatrics    Patient: Liliana Carmichael MRN: [id number]  CSN: [id number]    YOB: 1923  Age: 80 y.o. Sex: male    DOA: 5/10/2017 LOS:  LOS: 2 days                    Subjective:     Somnolent, arousable  confused  Not back to baseline  No cough  No pain    Review of Systems:  Eyes: negative  Ears, Nose, Mouth, Throat, and Face: negative  Respiratory: negative for dyspnea on exertion  Cardiovascular: negative for chest pain  Gastrointestinal: negative for nausea, vomiting and diarrhea  Genitourinary:negative for dysuria and hematuria  Integument/Breast: negative  Hematologic/Lymphatic: negative for bleeding  Musculoskeletal:negative for arthralgias  Neurological: negative for weakness  Behavioral/Psychiatric: negative for behavior problems  Endocrine: negative for temperature intolerance  Allergic/Immunologic: negative for hay fever    Objective:     Physical Exam:  Patient Vitals for the past 24 hrs:   Temp Pulse Resp BP SpO2   05/12/17 0550 99.2 °F (37.3 °C) 68 16 151/83 100 %   05/11/17 2243 98.5 °F (36.9 °C) 73 16 (!) 146/92 -   05/11/17 1800 98.3 °F (36.8 °C) 70 16 (!) 148/91 97 %   05/11/17 1542 98.4 °F (36.9 °C) 69 18 133/79 98 %     AF  BP up some    HEENT: wnl  NECK:  No venous distention or adenopathy   HEART: RRR, no rubs or gallops  LUNGS: Clear to auscultation  ABDOMEN: soft with active BS.  No tenderness, no distention, no organomegaly  EXT: warm,no edema  SKIN: Normal turgor, no breaks  NEURO: MS as above, non focal    Intake and Output:  Current Shift:     Last three shifts:  05/10 1901 - 05/12 0700  In: 1253.3 [I.V.:1253.3]  Out: -     Recent Results (from the past 24 hour(s))   PROTHROMBIN TIME + INR    Collection Time: 05/12/17  6:15 AM   Result Value Ref Range    Prothrombin time 25.5 (H) 11.5 - 15.2 sec    INR 2.5 (H) 0.8 - 1.2             Current Facility-Administered Medications   Medication Dose Route Frequency    piperacillin-tazobactam (ZOSYN) 3.375 g in 0.9% sodium chloride (MBP/ADV) 100 mL MBP EXTENDED 4 HOUR INFUSION###  3.375 g IntraVENous Q8H    levoFLOXacin (LEVAQUIN) 250 mg in D5W IVPB  250 mg IntraVENous Q24H    warfarin (COUMADIN) tablet 5 mg  5 mg Oral QHS    terazosin (HYTRIN) capsule 10 mg  10 mg Oral QHS    docusate sodium (COLACE) capsule 100 mg  100 mg Oral BID    polyethylene glycol (MIRALAX) packet 17 g  17 g Oral DAILY    pantoprazole (PROTONIX) tablet 40 mg  40 mg Oral ACB    aspirin delayed-release tablet 81 mg  81 mg Oral DAILY    brimonidine (ALPHAGAN P) 0.1 % ophthalmic solution 1 Drop  1 Drop Left Eye QPM    carboxymethylcellulose sodium (CELLUVISC) 0.5 % ophthalmic solution 1 Drop  1 Drop Right Eye DAILY    0.9% sodium chloride with KCl 40 mEq/L infusion   IntraVENous CONTINUOUS       Lab Results   Component Value Date/Time    Glucose 97 05/11/2017 07:45 AM    Glucose 109 05/10/2017 03:30 PM    Glucose 83 10/07/2016 03:40 AM    Glucose 80 10/06/2016 03:50 AM    Glucose 127 10/05/2016 04:25 AM        Assessment     Active Problems:    UTI (urinary tract infection) (5/10/2017)      Pneumonia (5/11/2017)      VINCENT (acute kidney injury) ([facility] [address] (6/50/9792)      Metabolic encephalopathy (0/11/9262)      SSS (sick sinus syndrome) ([facility] [address] (5/11/2017)      Dehydration (5/11/2017)        Plan     MS slightly  improved  Labs pending this AM  CXR ? RUL pneumonia  Urine C&S pending  Renal US , chronic medical disease, no hydronephrosis  Zosyn and Levaquin  Monitor renal function  Monitor INR  PT/OT shanika Reddy MD  5/12/2017,

## 2017-05-12 NOTE — PROGRESS NOTES
0805 Bedside and Verbal shift change report given to Aayush Ruiz RN (oncoming nurse) by Ellie Mckinney RN (offgoing nurse). Report included the following information SBAR, Procedure Summary, Intake/Output, MAR and Recent Results. Pt resting in bed, NAD. No c/o pain. Bed locked and lowered, siderails up x3. Call bell at bedside, will continue to monitor. 0936 Pt resting in bed, assessment completed, scheduled meds provided. Daughter at bedside, assisting with feeding. Will continue to monitor. 1211 Pt resting in bed, NAD, will continue to monitor. 1430 Pt resting in bed, NAD, visitor at bedside, will continue to monitor. 1624 Incontinence care provided, scheduled meds provided, dtr at bedside. 46 Pt refusing to take scheduled med at this time. Pt is non compliant with eating and meds when family is not at bedside. Will continue to monitor. 1920 Bedside and Verbal shift change report given to Crissy Brannon RN (oncoming nurse) by Jl Dominguez RN   (offgoing nurse). Report included the following information SBAR, Procedure Summary, Intake/Output, MAR and Recent Results.

## 2017-05-12 NOTE — PROGRESS NOTES
Chart reviewed. Plan remains SNF/TCC when medically stable & bed available. Will cont to follow. Jocelyne Howell RN,ext. [phone].

## 2017-05-12 NOTE — PROGRESS NOTES
OT order received and chart reviewed. 1045: 1st attempt for OT evaluation. Spa team leaving room. No family present in room. Per chart review, pt is nonverbal. Will re-attempt OT evaluation when family present to provide information regarding pt's PLOF/housing set up. Thank you.     Micheline Payne MS OTR/L  Office Ext: [phone]  Pager: [phone]

## 2017-05-12 NOTE — ROUTINE PROCESS
Bedside and Verbal shift change report from [facility] RN Report included the following information SBAR, MAR,  POC and Recent Results. 1400 No notable discomfort. Family in with patient. 1925 Bedside and Verbal shift change report rec'd from [facility] RN Report included the following information SBAR, MAR, Tele ST, POC and Recent Results.

## 2017-05-12 NOTE — PROGRESS NOTES
Problem: Self Care Deficits Care Plan (Adult)  Goal: *Acute Goals and Plan of Care (Insert Text)  Occupational Therapy Goals  Initiated 5/12/2017 within 7 day(s). Functional Maintenance Program.    1. Patient will perform AAROM to BUEs for maintenance of ROM and strength for ADLs. Outcome: Progressing Towards Goal  OCCUPATIONAL THERAPY EVALUATION     Patient: Garrett Bush (36 y.o. male)  Date: 5/12/2017  Primary Diagnosis: UTI (urinary tract infection)        Precautions:  Fall      ASSESSMENT :  Based on the objective data described below, the patient presents with impairments with regard to bed mobility, activity tolerance, BUE function/strength, and independence in ADLs. Pt drowsy t/o session; supportive caretaker at bedside; able to provide information regarding PLOF. Pt dependent for ADLs; assists with UB dressing PTA (per caretaker report, raises arms to don shirt). Pt demo'd decreased command following t/o session. 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 Pt reported \"ow\" when touching both hands; otherwise, nonverbal (moaning). PROM of BUEs x10 performed to maintain AROM. Caretaker also educated on BUE exercises; verbalized/demo'd understanding. Max A x2/total A for rolling. Transfer to EOB not assessed due to pt's drowsiness and level of assistance for rolling. At this time, pt would benefit from functional maintenance program to maintain/increase UE ROM and strength for ADL tasks. Rehab tech educated on and verbalized appropriate UE exercises for patient. Pt left comfortable with caretaker at bedside. Patient will benefit from skilled intervention to address the above impairments.   Patients rehabilitation potential is considered to be Fair  Factors which may influence rehabilitation potential include:   [ ]             None noted  [ ]             Mental ability/status  [X]             Medical condition  [ ]             Home/family situation and support systems  [ ] Safety awareness  [ ]             Pain tolerance/management  [ ]             Other:      Recommendations for nursing: PROM of BUEs; 10x, 3/day (OR as tolerated by patient)  Written on communication board: yes          PLAN :  Recommendations and Planned Interventions:  [X]               Self Care Training                  [X]        Therapeutic Activities  [X]               Functional Mobility Training    [ ]        Cognitive Retraining  [X]               Therapeutic Exercises           [X]        Endurance Activities  [X]               Balance Training                   [ ]        Neuromuscular Re-Education  [ ]               Visual/Perceptual Training     [X]   Home Safety Training  [X]               Patient Education                 [X]        Family Training/Education  [ ]               Other (comment):     Frequency/Duration: Patient will be followed by rehab tech 3-5 times a week to address goals, weekly by OT.   Discharge Recommendations: Miles Stock  Further Equipment Recommendations for Discharge: hospital bed       SUBJECTIVE:   Patient stated ow\" (during PROM; when touching both hands)      OBJECTIVE DATA SUMMARY:       Past Medical History:   Diagnosis Date    Atrial fibrillation ([facility] [address]      Benign prostate hyperplasia      Cellulitis       Lower extremity    Chronic renal insufficiency      Congestive heart failure (CHF) (HCC)      GERD (gastroesophageal reflux disease)      Glaucoma      Hypertension      Inguinal hernia      Klebsiella pneumonia ([facility] [address]      Pleural effusion, left      Sepsis ([facility] [address]      Venous insufficiency       Lower extremity     Past Surgical History:   Procedure Laterality Date    CARDIAC SURG PROCEDURE UNLIST        HX PACEMAKER         Barriers to Learning/Limitations: yes;  altered mental status (i.e.Sedation, Confusion)  Compensate with: visual, verbal, tactile, kinesthetic cues/model     GCODES:  Self Care  Current  CM= 80-99%   Goal  CL= 60-79%. The severity rating is based on the Other Functional Assessment, MMT, ROM     Eval Complexity: History: MEDIUM Complexity : Expanded review of history including physical, cognitive and psychosocial  history ; Examination: HIGH Complexity : 5 or more performance deficits relating to physical, cognitive , or psychosocial skils that result in activity limitations and / or participation restrictions; Decision Making:HIGH Complexity : Patient presents with comorbidities that affect occupational performance. Signifigant modification of tasks or assistance (eg, physical or verbal) with assessment (s) is necessary to enable patient to complete evaluation      Prior Level of Function/Home Situation: Pt was dependent with basic self care tasks and utilized rolling walker for functional mobility PTA (up until about a month ago). Home Situation  Home Environment: Private residence  # Steps to Enter: 3  One/Two Story Residence: Two story, live on 1st floor  Living Alone: No  Support Systems: Spouse/Significant Other/Partner, Family member(s)  Patient Expects to be Discharged to[de-identified] Private residence  Current DME Used/Available at Home: [employer] [place], quad, Walker, rolling  Tub or Shower Type: Other (comment) (per friend/caretaker, pt recieves sponge bathes)  [ ]  Right hand dominant           [ ]  Left hand dominant  Cognitive/Behavioral Status:  Neurologic State: Drowsy  Orientation Level: Unable to verbalize  Cognition: Decreased command following  Safety/Judgement: Awareness of environment; Fall prevention      Skin: Intact (BUEs)  Edema: None noted (BUEs)     Vision/Perceptual:    Acuity:  (unable to accurately assess)       Coordination:  Coordination: Grossly decreased, non-functional (BUEs; contractures in both hands; painful to mobilize)  Fine Motor Skills-Upper: Right Impaired;Left Impaired    Gross Motor Skills-Upper: Right Intact; Left Intact      Balance:  Sitting:  (not assessed; pt seen at  bed level) Strength:  Strength: Generally decreased, functional (BUEs: 3/5)     Tone & Sensation:  Tone: Abnormal  Sensation:  (unable to accurately assess)     Range of Motion:  AROM: Generally decreased, functional (BUEs: approx 1/2 shoulder flex with max vc's)  PROM: Generally decreased, functional (approx 3/4 shoulder flex; RUE elbow contracture)     Functional Mobility and Transfers for ADLs:  Bed Mobility:  Rolling: Maximum assistance; Total assistance;Assist x2  Supine to Sit:  (not assessed)     Transfers:  Sit to Stand:  (not assessed)     ADL Assessment:  Feeding: Maximum assistance  Oral Facial Hygiene/Grooming: Maximum assistance  Bathing: Total assistance  Upper Body Dressing: Maximum assistance  Lower Body Dressing: Total assistance  Toileting: Maximum assistance     Cognitive Retraining  Safety/Judgement: Awareness of environment; Fall prevention     Therapeutic Exercise:  PROM of BUEs (shoulder flexion, elbow flexion/extension, pronation/supination, wrist flexion/extension) performed 10x. Unable to perform PROM of R elbow secondary to contracture. Caretaker educated on PROM of BUEs; she verbalized and demo'd understanding. Reinforced to perform PROM as tolerated by patient (10x, 3x/day OR as tolerated by pt). Pain:  Pre treatment pain level: 0/10  Post treatment pain level: 0/10  Pain Scale 1: Adult Nonverbal Pain Scale   Pt reports \"ow\" during PROM of BUEs     Activity Tolerance:  Poor  Please refer to the flowsheet for vital signs taken during this treatment. After treatment:   [ ] Patient left in no apparent distress sitting up in chair  [X] Patient left in no apparent distress in bed  [X] Call bell left within reach  [X] Nursing notified  [X] Caregiver present  [ ] Bed alarm activated      COMMUNICATION/EDUCATION: Patient and long time friend/caretaker educated on role of OT and POC. Caretaker verbalized understanding.  Pt did not demonstrate understanding.   [X] Home safety education was provided and the patient/caregiver indicated understanding. [X] Patient/family have participated as able in goal setting and plan of care. [X] Patient/family agree to work toward stated goals and plan of care. [ ] Patient understands intent and goals of therapy, but is neutral about his/her participation. [ ] Patient is unable to participate in goal setting and plan of care.      Thank you for this referral.     Yenifer Holcomb MS OTR/L  Time Calculation: 24 mins

## 2017-05-12 NOTE — PROGRESS NOTES
2027  Bedside and Verbal shift change report given to josefa turner  (oncoming nurse) by Earline Dean rn  (offgoing nurse). Report included the following information SBAR, Kardex, Intake/Output, MAR and Recent Results. 0800 Bedside shift change report given to denice laurent rn  (oncoming nurse) by Mariama Ryder rn  (offgoing nurse). Report included the following information SBAR, Kardex, Intake/Output, MAR and Recent Results.

## 2017-05-13 LAB
ALBUMIN SERPL BCP-MCNC: 2.9 G/DL (ref 3.4–5)
ALBUMIN/GLOB SERPL: 0.8 {RATIO} (ref 0.8–1.7)
ALP SERPL-CCNC: 70 U/L (ref 45–117)
ALT SERPL-CCNC: 13 U/L (ref 16–61)
ANION GAP BLD CALC-SCNC: 7 MMOL/L (ref 3–18)
AST SERPL W P-5'-P-CCNC: 29 U/L (ref 15–37)
BILIRUB SERPL-MCNC: 1.1 MG/DL (ref 0.2–1)
BUN SERPL-MCNC: 13 MG/DL (ref 7–18)
BUN/CREAT SERPL: 8 (ref 12–20)
CALCIUM SERPL-MCNC: 9 MG/DL (ref 8.5–10.1)
CHLORIDE SERPL-SCNC: 113 MMOL/L (ref 100–108)
CO2 SERPL-SCNC: 26 MMOL/L (ref 21–32)
CREAT SERPL-MCNC: 1.54 MG/DL (ref 0.6–1.3)
GLOBULIN SER CALC-MCNC: 3.8 G/DL (ref 2–4)
GLUCOSE SERPL-MCNC: 91 MG/DL (ref 74–99)
INR PPP: 3 (ref 0.8–1.2)
POTASSIUM SERPL-SCNC: 5 MMOL/L (ref 3.5–5.5)
PROT SERPL-MCNC: 6.7 G/DL (ref 6.4–8.2)
PROTHROMBIN TIME: 29.5 SEC (ref 11.5–15.2)
SODIUM SERPL-SCNC: 146 MMOL/L (ref 136–145)

## 2017-05-13 PROCEDURE — 65270000029 HC RM PRIVATE

## 2017-05-13 PROCEDURE — 74011000258 HC RX REV CODE- 258: Performed by: INTERNAL MEDICINE

## 2017-05-13 PROCEDURE — 36415 COLL VENOUS BLD VENIPUNCTURE: CPT | Performed by: INTERNAL MEDICINE

## 2017-05-13 PROCEDURE — 77030020253 HC SOL INJ D545NS .05 DEX .45 SAL

## 2017-05-13 PROCEDURE — 85610 PROTHROMBIN TIME: CPT | Performed by: INTERNAL MEDICINE

## 2017-05-13 PROCEDURE — 74011250637 HC RX REV CODE- 250/637: Performed by: INTERNAL MEDICINE

## 2017-05-13 PROCEDURE — 80053 COMPREHEN METABOLIC PANEL: CPT | Performed by: INTERNAL MEDICINE

## 2017-05-13 PROCEDURE — 74011250636 HC RX REV CODE- 250/636: Performed by: INTERNAL MEDICINE

## 2017-05-13 RX ORDER — GUAIFENESIN 100 MG/5ML
81 LIQUID (ML) ORAL DAILY
Status: DISCONTINUED | OUTPATIENT
Start: 2017-05-14 | End: 2017-05-17

## 2017-05-13 RX ORDER — WARFARIN 2 MG/1
4 TABLET ORAL
Status: DISCONTINUED | OUTPATIENT
Start: 2017-05-13 | End: 2017-05-14

## 2017-05-13 RX ORDER — DEXTROSE MONOHYDRATE AND SODIUM CHLORIDE 5; .45 G/100ML; G/100ML
50 INJECTION, SOLUTION INTRAVENOUS CONTINUOUS
Status: DISCONTINUED | OUTPATIENT
Start: 2017-05-13 | End: 2017-05-16

## 2017-05-13 RX ADMIN — LEVOFLOXACIN 250 MG: 5 INJECTION, SOLUTION INTRAVENOUS at 16:34

## 2017-05-13 RX ADMIN — DOCUSATE SODIUM 100 MG: 100 CAPSULE, LIQUID FILLED ORAL at 21:52

## 2017-05-13 RX ADMIN — WARFARIN SODIUM 4 MG: 2 TABLET ORAL at 21:52

## 2017-05-13 RX ADMIN — PIPERACILLIN SODIUM,TAZOBACTAM SODIUM 3.38 G: 3; .375 INJECTION, POWDER, FOR SOLUTION INTRAVENOUS at 10:36

## 2017-05-13 RX ADMIN — BRIMONIDINE TARTRATE 1 DROP: 1 SOLUTION/ DROPS OPHTHALMIC at 21:52

## 2017-05-13 RX ADMIN — PIPERACILLIN SODIUM,TAZOBACTAM SODIUM 3.38 G: 3; .375 INJECTION, POWDER, FOR SOLUTION INTRAVENOUS at 16:34

## 2017-05-13 RX ADMIN — BRIMONIDINE TARTRATE 1 DROP: 1 SOLUTION/ DROPS OPHTHALMIC at 10:38

## 2017-05-13 RX ADMIN — TERAZOSIN HYDROCHLORIDE 10 MG: 5 CAPSULE ORAL at 21:52

## 2017-05-13 RX ADMIN — DEXTROSE MONOHYDRATE AND SODIUM CHLORIDE 50 ML/HR: 5; .45 INJECTION, SOLUTION INTRAVENOUS at 16:23

## 2017-05-13 RX ADMIN — CARBOXYMETHYLCELLULOSE SODIUM 1 DROP: 5 SOLUTION/ DROPS OPHTHALMIC at 10:38

## 2017-05-13 NOTE — ROUTINE PROCESS
1925: Assumed care. Somnolent. Arousable. Not conversing. On RA. No discomfort noted at this time. Call light within reach. 2247: No change from previous assessment. 2344: Due meds given. Mixed w/ apple sauce. Patient tried to spit it out. Encouraged to swallow. 0147: No change from previous assessment. 0600: Slept good thru night. Needs attended. Turned & repositioned at intervals. 0730: Bedside and Verbal shift change report given to Lynn Hoskins RN (oncoming nurse) by Johnathan Irene RN (offgoing nurse). Report included the following information SBAR, Kardex, Intake/Output, MAR and Recent Results.

## 2017-05-13 NOTE — PROGRESS NOTES
1440: PT order received and chart reviewed. 1st attempt for PT evaluation. No family present in room. Per chart review, pt is nonverbal. Will re-attempt PT evaluation when family present to provide information regarding pt's PLOF/housing set up.    [id number]: attempted to see patient again. Family present however, pt very lethargic and sleeping. Pt is unable to be aroused by PT or family members. Will try at a later time when patient is more awake.      Thank you    Fabián Castillo PT, DPT

## 2017-05-13 NOTE — PROGRESS NOTES
0730- Assumed care from [address] [place] [address] 201. Patient lying in bed awake non verbal unable to state his name, track with his eyes occasionally, IVf infusing will cont. To monitor. 0900- Family member at bedside assisted with feeding. 1000- Po medication given with apple sauce. Patient spitting out his medications. 1500- Family members visiting no change in patient condition. 1800- Pt. Resting quietly in bed no change in status. 1922-Bedside and Verbal shift change report given to Mary Cosme RN (oncoming nurse) by Jorge North RN (offgoing nurse). Report included the following information SBAR, Kardex, ED Summary, [facility] and Recent Results.

## 2017-05-14 ENCOUNTER — APPOINTMENT (OUTPATIENT)
Dept: GENERAL RADIOLOGY | Age: 82
DRG: 682 | End: 2017-05-14
Attending: INTERNAL MEDICINE
Payer: MEDICARE

## 2017-05-14 LAB
ALBUMIN SERPL BCP-MCNC: 2.8 G/DL (ref 3.4–5)
ALBUMIN/GLOB SERPL: 0.8 {RATIO} (ref 0.8–1.7)
ALP SERPL-CCNC: 66 U/L (ref 45–117)
ALT SERPL-CCNC: 11 U/L (ref 16–61)
AMMONIA PLAS-SCNC: 53 UMOL/L (ref 11–32)
ANION GAP BLD CALC-SCNC: 8 MMOL/L (ref 3–18)
ARTERIAL PATENCY WRIST A: ABNORMAL
AST SERPL W P-5'-P-CCNC: 21 U/L (ref 15–37)
BASE DEFICIT BLD-SCNC: 2 MMOL/L
BDY SITE: ABNORMAL
BILIRUB SERPL-MCNC: 1.3 MG/DL (ref 0.2–1)
BODY TEMPERATURE: 100.4
BUN SERPL-MCNC: 10 MG/DL (ref 7–18)
BUN/CREAT SERPL: 7 (ref 12–20)
CALCIUM SERPL-MCNC: 8.8 MG/DL (ref 8.5–10.1)
CHLORIDE SERPL-SCNC: 110 MMOL/L (ref 100–108)
CO2 SERPL-SCNC: 25 MMOL/L (ref 21–32)
CREAT SERPL-MCNC: 1.45 MG/DL (ref 0.6–1.3)
GAS FLOW.O2 O2 DELIVERY SYS: ABNORMAL L/MIN
GLOBULIN SER CALC-MCNC: 3.4 G/DL (ref 2–4)
GLUCOSE SERPL-MCNC: 104 MG/DL (ref 74–99)
HCO3 BLD-SCNC: 22.4 MMOL/L (ref 22–26)
INR PPP: 3.7 (ref 0.8–1.2)
LACTATE SERPL-SCNC: 1.4 MMOL/L (ref 0.4–2)
O2/TOTAL GAS SETTING VFR VENT: 21 %
PCO2 BLD: 36.1 MMHG (ref 35–45)
PH BLD: 7.41 [PH] (ref 7.35–7.45)
PO2 BLD: 73 MMHG (ref 80–100)
POTASSIUM SERPL-SCNC: 4 MMOL/L (ref 3.5–5.5)
PROT SERPL-MCNC: 6.2 G/DL (ref 6.4–8.2)
PROTHROMBIN TIME: 34.6 SEC (ref 11.5–15.2)
SAO2 % BLD: 94 % (ref 92–97)
SERVICE CMNT-IMP: ABNORMAL
SODIUM SERPL-SCNC: 143 MMOL/L (ref 136–145)
SPECIMEN TYPE: ABNORMAL
TOTAL RESP. RATE, ITRR: 20

## 2017-05-14 PROCEDURE — 82140 ASSAY OF AMMONIA: CPT | Performed by: INTERNAL MEDICINE

## 2017-05-14 PROCEDURE — 71010 XR CHEST PORT: CPT

## 2017-05-14 PROCEDURE — 82803 BLOOD GASES ANY COMBINATION: CPT

## 2017-05-14 PROCEDURE — 83605 ASSAY OF LACTIC ACID: CPT | Performed by: INTERNAL MEDICINE

## 2017-05-14 PROCEDURE — 87040 BLOOD CULTURE FOR BACTERIA: CPT | Performed by: INTERNAL MEDICINE

## 2017-05-14 PROCEDURE — 65270000029 HC RM PRIVATE

## 2017-05-14 PROCEDURE — 74011250636 HC RX REV CODE- 250/636: Performed by: INTERNAL MEDICINE

## 2017-05-14 PROCEDURE — 77030020253 HC SOL INJ D545NS .05 DEX .45 SAL

## 2017-05-14 PROCEDURE — 36600 WITHDRAWAL OF ARTERIAL BLOOD: CPT

## 2017-05-14 PROCEDURE — 85610 PROTHROMBIN TIME: CPT | Performed by: INTERNAL MEDICINE

## 2017-05-14 PROCEDURE — 74011000258 HC RX REV CODE- 258: Performed by: INTERNAL MEDICINE

## 2017-05-14 PROCEDURE — 80053 COMPREHEN METABOLIC PANEL: CPT | Performed by: INTERNAL MEDICINE

## 2017-05-14 PROCEDURE — 36415 COLL VENOUS BLD VENIPUNCTURE: CPT | Performed by: INTERNAL MEDICINE

## 2017-05-14 PROCEDURE — 74011250637 HC RX REV CODE- 250/637: Performed by: INTERNAL MEDICINE

## 2017-05-14 RX ORDER — ACETAMINOPHEN 650 MG/1
650 SUPPOSITORY RECTAL
Status: DISCONTINUED | OUTPATIENT
Start: 2017-05-14 | End: 2017-05-18 | Stop reason: HOSPADM

## 2017-05-14 RX ADMIN — PIPERACILLIN SODIUM,TAZOBACTAM SODIUM 3.38 G: 3; .375 INJECTION, POWDER, FOR SOLUTION INTRAVENOUS at 00:19

## 2017-05-14 RX ADMIN — PIPERACILLIN SODIUM,TAZOBACTAM SODIUM 3.38 G: 3; .375 INJECTION, POWDER, FOR SOLUTION INTRAVENOUS at 09:57

## 2017-05-14 RX ADMIN — ACETAMINOPHEN 650 MG: 650 SUPPOSITORY RECTAL at 16:21

## 2017-05-14 RX ADMIN — DEXTROSE MONOHYDRATE AND SODIUM CHLORIDE 50 ML/HR: 5; .45 INJECTION, SOLUTION INTRAVENOUS at 11:38

## 2017-05-14 RX ADMIN — PIPERACILLIN SODIUM,TAZOBACTAM SODIUM 3.38 G: 3; .375 INJECTION, POWDER, FOR SOLUTION INTRAVENOUS at 16:45

## 2017-05-14 RX ADMIN — ASPIRIN 81 MG CHEWABLE TABLET 81 MG: 81 TABLET CHEWABLE at 11:57

## 2017-05-14 RX ADMIN — BRIMONIDINE TARTRATE 1 DROP: 1 SOLUTION/ DROPS OPHTHALMIC at 10:10

## 2017-05-14 RX ADMIN — LEVOFLOXACIN 250 MG: 5 INJECTION, SOLUTION INTRAVENOUS at 16:46

## 2017-05-14 RX ADMIN — CARBOXYMETHYLCELLULOSE SODIUM 1 DROP: 5 SOLUTION/ DROPS OPHTHALMIC at 09:10

## 2017-05-14 NOTE — PROGRESS NOTES
1115: attempted to see patient. Son was present. Pt is unable to be aroused by PT or family members.  Will try at a later time when patient is more awake    Thank you   Truman Miranda, PT, DPT

## 2017-05-14 NOTE — PROGRESS NOTES
1259: 2nd attempt for PT. Patient asleep. Will re-attempt PT as schedule permits.      Thank you,     Vy Elizalde, PT, DPT

## 2017-05-14 NOTE — ROUTINE PROCESS
1915: Assumed care. Sleeping. No discomfort noted at this time. Call light within reach. 2030: Daughter &  at bedside. Updated. 2048: No change from previous assessment. 2152: HOB elavated. Due meds given. Mixed w/ peaches. Able to swallow solids w/out difficulty. 0019: Due med given. 0158: No change from previous assessment. 0230: Sleeping. 6882: No change from previous assessment. 0645: Slept good thru night. Needs attended. Turned & repositioned at intervals. 0715: Bedside and Verbal shift change report given to Jack Cartwright RN (oncoming nurse) by Gali Hewitt RN (offgoing nurse). Report included the following information SBAR, Kardex, Intake/Output, MAR and Recent Results.

## 2017-05-14 NOTE — PROGRESS NOTES
General Internal Medicine/Geriatrics    Patient: Ravin lAas MRN: [id number]  CSN: [id number]    YOB: 1923  Age: 80 y.o. Sex: male    DOA: 5/10/2017 LOS:  LOS: 4 days                    Subjective:     Somnolent, arousable  confused  Not back to baseline  No cough  No pain    Review of Systems:  Eyes: negative  Ears, Nose, Mouth, Throat, and Face: negative  Respiratory: negative for dyspnea on exertion  Cardiovascular: negative for chest pain  Gastrointestinal: negative for nausea, vomiting and diarrhea  Genitourinary:negative for dysuria and hematuria  Integument/Breast: negative  Hematologic/Lymphatic: negative for bleeding  Musculoskeletal:negative for arthralgias  Neurological: negative for weakness  Behavioral/Psychiatric: negative for behavior problems  Endocrine: negative for temperature intolerance  Allergic/Immunologic: negative for hay fever    Objective:     Physical Exam:  Patient Vitals for the past 24 hrs:   Temp Pulse Resp BP SpO2   05/14/17 0948 100.4 °F (38 °C) 72 18 106/68 95 %   05/14/17 0554 99 °F (37.2 °C) 69 18 146/87 99 %   05/14/17 0158 99 °F (37.2 °C) 82 18 99/84 95 %   05/13/17 2048 98.7 °F (37.1 °C) 71 20 (!) 117/107 96 %   05/13/17 1815 99 °F (37.2 °C) 70 20 137/80 96 %   05/13/17 1432 99.6 °F (37.6 °C) 70 20 175/71 99 %     AF  BP up some    HEENT: wnl  NECK:  No venous distention or adenopathy   HEART: RRR, no rubs or gallops  LUNGS: Clear to auscultation  ABDOMEN: soft with active BS. No tenderness, no distention, no organomegaly  EXT: warm,no edema  SKIN: Normal turgor, no breaks  NEURO: MS as above, non focal    Intake and Output:  Current Shift:     Last three shifts:  05/12 1901 - 05/14 0700  In: 2636.7 [P.O.:600;  I.V.:2036.7]  Out: 1000 [Urine:1000]    Recent Results (from the past 24 hour(s))   PROTHROMBIN TIME + INR    Collection Time: 05/14/17  5:09 AM   Result Value Ref Range    Prothrombin time 34.6 (H) 11.5 - 15.2 sec    INR 3.7 (H) 0.8 - 1.2 METABOLIC PANEL, COMPREHENSIVE    Collection Time: 05/14/17  5:09 AM   Result Value Ref Range    Sodium 143 136 - 145 mmol/L    Potassium 4.0 3.5 - 5.5 mmol/L    Chloride 110 (H) 100 - 108 mmol/L    CO2 25 21 - 32 mmol/L    Anion gap 8 3.0 - 18 mmol/L    Glucose 104 (H) 74 - 99 mg/dL    BUN 10 7.0 - 18 MG/DL    Creatinine 1.45 (H) 0.6 - 1.3 MG/DL    BUN/Creatinine ratio 7 (L) 12 - 20      GFR est AA 55 (L) >60 ml/min/1.73m2    GFR est non-AA 45 (L) >60 ml/min/1.73m2    Calcium 8.8 8.5 - 10.1 MG/DL    Bilirubin, total 1.3 (H) 0.2 - 1.0 MG/DL    ALT (SGPT) 11 (L) 16 - 61 U/L    AST (SGOT) 21 15 - 37 U/L    Alk.  phosphatase 66 45 - 117 U/L    Protein, total 6.2 (L) 6.4 - 8.2 g/dL    Albumin 2.8 (L) 3.4 - 5.0 g/dL    Globulin 3.4 2.0 - 4.0 g/dL    A-G Ratio 0.8 0.8 - 1.7             Current Facility-Administered Medications   Medication Dose Route Frequency    dextrose 5 % - 0.45% NaCl infusion  50 mL/hr IntraVENous CONTINUOUS    aspirin chewable tablet 81 mg  81 mg Oral DAILY    brimonidine (ALPHAGAN P) 0.1 % ophthalmic solution 1 Drop  1 Drop Left Eye Q12H    piperacillin-tazobactam (ZOSYN) 3.375 g in 0.9% sodium chloride (MBP/ADV) 100 mL MBP EXTENDED 4 HOUR INFUSION###  3.375 g IntraVENous Q8H    levoFLOXacin (LEVAQUIN) 250 mg in D5W IVPB  250 mg IntraVENous Q24H    terazosin (HYTRIN) capsule 10 mg  10 mg Oral QHS    docusate sodium (COLACE) capsule 100 mg  100 mg Oral BID    polyethylene glycol (MIRALAX) packet 17 g  17 g Oral DAILY    pantoprazole (PROTONIX) tablet 40 mg  40 mg Oral ACB    carboxymethylcellulose sodium (CELLUVISC) 0.5 % ophthalmic solution 1 Drop  1 Drop Right Eye DAILY       Lab Results   Component Value Date/Time    Glucose 104 05/14/2017 05:09 AM    Glucose 91 05/13/2017 06:13 AM    Glucose 101 05/12/2017 06:15 AM    Glucose 97 05/11/2017 07:45 AM    Glucose 109 05/10/2017 03:30 PM        Assessment     Active Problems:    UTI (urinary tract infection) (5/10/2017)      Pneumonia (5/11/2017)      VINCENT (acute kidney injury) ([facility] [address] (8/22/1272)      Metabolic encephalopathy (3/54/4305)      SSS (sick sinus syndrome) ([facility] (5/11/2017)      Dehydration (5/11/2017)        Plan     MS not improving significantly  Check Ammonia, ABG., ? Repeat CT   renal stabilized  Adjust fluids  CXR ? RUL pneumonia  Urine C&S negative so far  Renal US , chronic medical disease, no hydronephrosis  Zosyn and Levaquin  Monitor renal function  Monitor INR, hold coumadin  PT/OT eval        Kymberly Seymour MD  5/14/2017,

## 2017-05-14 NOTE — PROGRESS NOTES
0720-Assumed care from [address] [place] [address] 201. Patient lying in bed eyes closed respiration even and unlabored, no acute distress noted will cont to monitor. 0930- Patient not awake enough to eat. He spits out the small amount of food put in his mouth and drools. 1250- Dr. Diane Duggan in to see patient updated with care. 1320- Unable to feed patient with lunch. Pt. Refusing keeping his mouth shut. 1530- pt. Temperature 101.2, Dr. Diane Duggan notified new order for Blood cx x2, CRX portable, and Tylenol 650mg per rectaly. 80- Family members in the room visiting, patient still asleep.    1621- 650 tylenol given via rectally. 1750- Temperature down to 99.9    1930-Bedside and Verbal shift change report given to Blake Gross RN (oncoming nurse) by Editha Severe RN (offgoing nurse). Report included the following information SBAR, Kardex, ED Summary, [facility] and Recent Results.

## 2017-05-15 LAB
INR PPP: 4.3 (ref 0.8–1.2)
PROTHROMBIN TIME: 39 SEC (ref 11.5–15.2)

## 2017-05-15 PROCEDURE — 74011250636 HC RX REV CODE- 250/636: Performed by: INTERNAL MEDICINE

## 2017-05-15 PROCEDURE — 65270000029 HC RM PRIVATE

## 2017-05-15 PROCEDURE — 74011250637 HC RX REV CODE- 250/637: Performed by: INTERNAL MEDICINE

## 2017-05-15 PROCEDURE — 74011000258 HC RX REV CODE- 258: Performed by: INTERNAL MEDICINE

## 2017-05-15 PROCEDURE — 97161 PT EVAL LOW COMPLEX 20 MIN: CPT

## 2017-05-15 PROCEDURE — 85610 PROTHROMBIN TIME: CPT | Performed by: INTERNAL MEDICINE

## 2017-05-15 PROCEDURE — 36415 COLL VENOUS BLD VENIPUNCTURE: CPT | Performed by: INTERNAL MEDICINE

## 2017-05-15 RX ADMIN — BRIMONIDINE TARTRATE 1 DROP: 1 SOLUTION/ DROPS OPHTHALMIC at 09:13

## 2017-05-15 RX ADMIN — PANTOPRAZOLE SODIUM 40 MG: 40 TABLET, DELAYED RELEASE ORAL at 08:57

## 2017-05-15 RX ADMIN — PIPERACILLIN SODIUM,TAZOBACTAM SODIUM 3.38 G: 3; .375 INJECTION, POWDER, FOR SOLUTION INTRAVENOUS at 17:07

## 2017-05-15 RX ADMIN — ASPIRIN 81 MG CHEWABLE TABLET 81 MG: 81 TABLET CHEWABLE at 08:57

## 2017-05-15 RX ADMIN — METHYLPREDNISOLONE SODIUM SUCCINATE 40 MG: 40 INJECTION, POWDER, FOR SOLUTION INTRAMUSCULAR; INTRAVENOUS at 09:12

## 2017-05-15 RX ADMIN — CARBOXYMETHYLCELLULOSE SODIUM 1 DROP: 5 SOLUTION/ DROPS OPHTHALMIC at 09:13

## 2017-05-15 RX ADMIN — BRIMONIDINE TARTRATE 1 DROP: 1 SOLUTION/ DROPS OPHTHALMIC at 00:24

## 2017-05-15 RX ADMIN — PIPERACILLIN SODIUM,TAZOBACTAM SODIUM 3.38 G: 3; .375 INJECTION, POWDER, FOR SOLUTION INTRAVENOUS at 08:54

## 2017-05-15 RX ADMIN — PIPERACILLIN SODIUM,TAZOBACTAM SODIUM 3.38 G: 3; .375 INJECTION, POWDER, FOR SOLUTION INTRAVENOUS at 23:21

## 2017-05-15 RX ADMIN — METHYLPREDNISOLONE SODIUM SUCCINATE 40 MG: 40 INJECTION, POWDER, FOR SOLUTION INTRAMUSCULAR; INTRAVENOUS at 15:06

## 2017-05-15 RX ADMIN — DEXTROSE MONOHYDRATE AND SODIUM CHLORIDE 50 ML/HR: 5; .45 INJECTION, SOLUTION INTRAVENOUS at 07:55

## 2017-05-15 RX ADMIN — TERAZOSIN HYDROCHLORIDE 10 MG: 5 CAPSULE ORAL at 21:45

## 2017-05-15 RX ADMIN — LEVOFLOXACIN 250 MG: 5 INJECTION, SOLUTION INTRAVENOUS at 15:02

## 2017-05-15 RX ADMIN — METHYLPREDNISOLONE SODIUM SUCCINATE 40 MG: 40 INJECTION, POWDER, FOR SOLUTION INTRAMUSCULAR; INTRAVENOUS at 21:44

## 2017-05-15 RX ADMIN — BRIMONIDINE TARTRATE 1 DROP: 1 SOLUTION/ DROPS OPHTHALMIC at 21:57

## 2017-05-15 RX ADMIN — PIPERACILLIN SODIUM,TAZOBACTAM SODIUM 3.38 G: 3; .375 INJECTION, POWDER, FOR SOLUTION INTRAVENOUS at 00:23

## 2017-05-15 NOTE — PROGRESS NOTES
Problem: Mobility Impaired (Adult and Pediatric)  Goal: *Acute Goals and Plan of Care (Insert Text)  PHYSICAL THERAPY SHORT TERM GOALS :   1. Patient will perform/complete roll To sidelying to assist with cleaning with moderate assistance within 1 week(s). 2. Patient will perform/completesit in midline with support on left side in chair position in bed for 30 with supervision/set-up within 1 week(s). 3. Patient will participate in lower extremity therapeutic exercise/activities with minimal assistance/contact guard assist for 8 minutes within 1 week(s). 7. Patient s family will be educated and give return demonstration on positioning in Bed to prevent bed sores and increase comfort. Therapist Tom Martinez, PT 5/15/2017  Time Calculation: 16 mins  Outcome: Progressing Towards Goal  PHYSICAL THERAPY EVALUATION     Patient: Zhen Steiner (71 y.o. male)  Date: 5/15/2017  Primary Diagnosis: UTI (urinary tract infection)        Precautions:   Fall      PROBLEM LIST:  Patient presents with the following problems:   Bed Mobility, Transfers, Range of Motion, Balance, Home Exercise Program, Precautions and Skin Integrity/Hygiene  ASSESSMENT :   Patient requires between maximal assistance and total assistance for bed mobility, . Patient unable to follow commands and is non verbal.  Patient had eyes closed most of session. Patient rolled for changing gown and blue pad with max A of two. Positioned in midline using blanket roll on left trunk and in chair position. Patient's family present for treatment. Verbalized understanding of positioning needs reinforcement. No evidence of learning by patient. No grimace noticed with  Moving just resisting rolling to both sides. Rehab tech given return demonstration of exercises previously. Patient will benefit from skilled intervention to address the above impairments.   Patients rehabilitation potential is considered to be Fair  Factors which may influence rehabilitation potential include:   [ ]         None noted  [ ]         Mental ability/status  [X]         Medical condition  [ ]         Home/family situation and support systems  [ ]         Safety awareness  [ ]         Pain tolerance/management  [ ]         Other:        PLAN :  Recommendations and Planned Interventions:  [X]           Bed Mobility Training             [X]    Neuromuscular Re-Education  [X]           Transfer Training                   [ ]    Orthotic/Prosthetic Training  [X]           Gait Training                          [ ]    Modalities  [X]           Therapeutic Exercises          [ ]    Edema Management/Control  [X]           Therapeutic Activities            [X]    Patient and Family Training/Education  [ ]           Other (comment):     Frequency/Duration: Patient will be followed by physical therapytech 3-5times a week for FMP of exercises  And a minimum of once a week by physical therapist to monitor change. to address goals.   Discharge Recommendations: Rehab, Home Health and To Be Determined  Further Equipment Recommendations for Discharge: N/A       SUBJECTIVE:   Patient stated .      OBJECTIVE DATA SUMMARY:       Past Medical History:   Diagnosis Date    Atrial fibrillation ([address]      Benign prostate hyperplasia      Cellulitis       Lower extremity    Chronic renal insufficiency      Congestive heart failure (CHF) (HCC)      GERD (gastroesophageal reflux disease)      Glaucoma      Hypertension      Inguinal hernia      Klebsiella pneumonia (HCC)      Pleural effusion, left      Sepsis ([address]      Venous insufficiency       Lower extremity     Past Surgical History:   Procedure Laterality Date    CARDIAC SURG PROCEDURE UNLIST        HX PACEMAKER         Barriers to Learning/Limitations: yes;  sensory deficits-vision/hearing/speech, physical and altered mental status (i.e.Sedation, Confusion)  Compensate with: visual, verbal, tactile, kinesthetic cues/model     G CODES:Mobility [id number] Current  CM= 80-99%   Goal  CK= 40-59%. The severity rating is based on the Other clinical findings. Eval Complexity: History: HIGH Complexity :3+ comorbidities / personal factors will impact the outcome/ POC Exam:MEDIUM Complexity : 3 Standardized tests and measures addressing body structure, function, activity limitation and / or participation in recreation  Presentation: LOW Complexity : Stable, uncomplicated  Clinical Decision Making:Low Complexity clinical findings  Overall Complexity:LOW      Prior Level of Function/Home Situation: in bed with family assisting in care and positioning   Home Situation  Home Environment: Private residence  # Steps to Enter: 3  One/Two Story Residence: Two story, live on 1st floor  Living Alone: No  Support Systems: Spouse/Significant Other/Partner, Family member(s)  Patient Expects to be Discharged to[de-identified] Private residence  Current DME Used/Available at Home: Natalie Calvin, quad, Walker, rolling  Tub or Shower Type: Other (comment) (per friend/caretaker, pt recieves sponge bathes)  Critical Behavior:  Neurologic State: Drowsy; Unresponsive  Orientation Level: Unable to verbalize  Cognition: Decreased command following;Decreased attention/concentration  Safety/Judgement: Fall prevention  Psychosocial  Patient Behaviors: Lethargic;Calm  Family  Behaviors: Calm; Cooperative; Appropriate for situation  Purposeful Interaction: Yes  Pt Identified Daily Priority: Clinical issues (comment)  Caritas Process: Nurture loving kindness;Enable janes/hope;Establish trust;Nurture spiritual self;Teaching/learning; Attend basic human needs;Create healing environment  Caring Interventions: Therapeutic modalities; Reassure  Reassure: Therapeutic listening; Informing; Acceptance; Instilling janes and hope;Support family  Therapeutic Modalities: Intentional therapeutic touch  Skin Condition/Temp: Warm  Family  Behaviors: Calm; Cooperative; Appropriate for situation  Skin Integrity: Intact  Skin Integumentary  Skin Color: Appropriate for ethnicity  Skin Condition/Temp: Warm  Skin Integrity: Intact  Turgor: Non-tenting  Strength:    Strength: Generally decreased, functional  resisting movement of legs and arms  May be more tone than strength. Tone & Sensation:   Tone: Abnormal  Range Of Motion:  AROM: Generally decreased, functional   PROM: Generally decreased, functional  Functional Mobility:  Bed Mobility:  Rolling: Maximum assistance; Additional time;Assist x2  Balance:   Sitting:  (unable to test)  Therapeutic Exercises:   Prom with tone reduction while cleaning and changing gown and linen. Pain:  Pre treatment pain level:0  Post treatment pain level:0  Pain Scale 1: Adult Nonverbal Pain Scale  Pain Intensity 1: 0  Activity Tolerance:   Fair   Please refer to the flowsheet for vital signs taken during this treatment. After treatment:   [ ]         Patient left in no apparent distress sitting up in chair  [X]         Patient left in no apparent distress in bed  [X]         Call bell left within reach  [X]         Nursing notified  [X]         Caregiver present  [ ]         Bed alarm activated      COMMUNICATION/EDUCATION:   [X]         Fall prevention education was provided and the patient/caregiver indicated understanding. [ ]         Patient/family have participated as able in goal setting and plan of care. [ ]         Patient/family agree to work toward stated goals and plan of care. [ ]         Patient understands intent and goals of therapy, but is neutral about his/her participation. [X]         Patient is unable to participate in goal setting and plan of care. Patient 's family educated on the role of physical therapy during the acute stay  and the importance of mobility. VU. No evidence of learning by patient.        Thank you for this referral.  Josr Colón, PT   Time Calculation: 16 mins

## 2017-05-15 NOTE — PROGRESS NOTES
General Internal Medicine/Geriatrics    Patient: Isela Ragsdale MRN: [id number]  CSN: [id number]    YOB: 1923  Age: 80 y.o. Sex: male    DOA: 5/10/2017 LOS:  LOS: 5 days                    Subjective:     Somnolent, arousable  Non-verbal  Not back to baseline  No cough   pain with leg and arm movement    Review of Systems:  Eyes: negative  Ears, Nose, Mouth, Throat, and Face: negative  Respiratory: negative for dyspnea on exertion  Cardiovascular: negative for chest pain  Gastrointestinal: negative for nausea, vomiting and diarrhea  Genitourinary:negative for dysuria and hematuria  Integument/Breast: negative  Hematologic/Lymphatic: negative for bleeding  Musculoskeletal:negative for arthralgias  Neurological: negative for weakness  Behavioral/Psychiatric: negative for behavior problems  Endocrine: negative for temperature intolerance  Allergic/Immunologic: negative for hay fever    Objective:     Physical Exam:  Patient Vitals for the past 24 hrs:   Temp Pulse Resp BP SpO2   05/15/17 0553 99.1 °F (37.3 °C) 70 18 132/75 98 %   05/15/17 0110 99.4 °F (37.4 °C) 69 18 118/66 99 %   05/14/17 2136 99.5 °F (37.5 °C) 78 22 136/77 95 %   05/14/17 1750 99.9 °F (37.7 °C) 68 22 140/79 100 %   05/14/17 1526 (!) 101.2 °F (38.4 °C) 70 18 106/67 96 %   05/14/17 0948 100.4 °F (38 °C) 72 18 106/68 95 %     Temp up  BP up some    HEENT: wnl  NECK:  No venous distention or adenopathy   HEART: RRR, no rubs or gallops  LUNGS: Clear to auscultation  ABDOMEN: soft with active BS. No tenderness, no distention, no organomegaly  EXT: tender swollen Lt.> Rt. Knee  SKIN: Normal turgor, no breaks  NEURO: MS as above, non focal    Intake and Output:  Current Shift:  05/15 0701 - 05/15 1900  In: -   Out: 300 [Urine:300]  Last three shifts:  05/13 1901 - 05/15 0700  In: 2415 [P.O.:370;  I.V.:2045]  Out: 1200 [Urine:1200]    Recent Results (from the past 24 hour(s))   AMMONIA    Collection Time: 05/14/17  1:43 PM   Result Value Ref Range    Ammonia 53 (H) 11 - 32 UMOL/L   POC G3    Collection Time: 05/14/17  3:17 PM   Result Value Ref Range    Device: ROOM AIR      FIO2 (POC) 21 %    pH (POC) 7.406 7.35 - 7.45      pCO2 (POC) 36.1 35 - 45 MMHG    pO2 (POC) 73 (L) 80 - 100 MMHG    HCO3 (POC) 22.4 22 - 26 MMOL/L    sO2 (POC) 94 92 - 97 %    Base deficit (POC) 2 mmol/L    Allens test (POC) N/A      Total resp. rate 20      Site RIGHT RADIAL      Patient temp.  100.4      Specimen type (POC) ARTERIAL      Performed by [address], BLOOD    Collection Time: 05/14/17  4:30 PM   Result Value Ref Range    Special Requests: NO SPECIAL REQUESTS      Culture result: NO GROWTH AFTER 14 HOURS     LACTIC ACID, PLASMA    Collection Time: 05/14/17  4:40 PM   Result Value Ref Range    Lactic acid 1.4 0.4 - 2.0 MMOL/L   CULTURE, BLOOD    Collection Time: 05/14/17  4:40 PM   Result Value Ref Range    Special Requests: NO SPECIAL REQUESTS      Culture result: NO GROWTH AFTER 14 HOURS     PROTHROMBIN TIME + INR    Collection Time: 05/15/17  5:05 AM   Result Value Ref Range    Prothrombin time 39.0 (H) 11.5 - 15.2 sec    INR 4.3 (H) 0.8 - 1.2             Current Facility-Administered Medications   Medication Dose Route Frequency    methylPREDNISolone (PF) (SOLU-MEDROL) injection 40 mg  40 mg IntraVENous Q8H    acetaminophen (TYLENOL) suppository 650 mg  650 mg Rectal Q4H PRN    dextrose 5 % - 0.45% NaCl infusion  50 mL/hr IntraVENous CONTINUOUS    aspirin chewable tablet 81 mg  81 mg Oral DAILY    brimonidine (ALPHAGAN P) 0.1 % ophthalmic solution 1 Drop  1 Drop Left Eye Q12H    piperacillin-tazobactam (ZOSYN) 3.375 g in 0.9% sodium chloride (MBP/ADV) 100 mL MBP EXTENDED 4 HOUR INFUSION###  3.375 g IntraVENous Q8H    levoFLOXacin (LEVAQUIN) 250 mg in D5W IVPB  250 mg IntraVENous Q24H    terazosin (HYTRIN) capsule 10 mg  10 mg Oral QHS    docusate sodium (COLACE) capsule 100 mg  100 mg Oral BID    polyethylene glycol (MIRALAX) packet 17 g  17 g Oral DAILY    pantoprazole (PROTONIX) tablet 40 mg  40 mg Oral ACB    carboxymethylcellulose sodium (CELLUVISC) 0.5 % ophthalmic solution 1 Drop  1 Drop Right Eye DAILY       Lab Results   Component Value Date/Time    Glucose 104 05/14/2017 05:09 AM    Glucose 91 05/13/2017 06:13 AM    Glucose 101 05/12/2017 06:15 AM    Glucose 97 05/11/2017 07:45 AM    Glucose 109 05/10/2017 03:30 PM        Assessment     Active Problems:    UTI (urinary tract infection) (5/10/2017)      Pneumonia (5/11/2017)      VINCENT (acute kidney injury) ([facility] [address] (4/28/7448)      Metabolic encephalopathy (8/38/5840)      SSS (sick sinus syndrome) ([facility] (5/11/2017)      Dehydration (5/11/2017)        Plan     MS not improving significantly   Ammonia, ABG., will ask neurology to see  Empiric steroids for ? Acute gout  Dtr. Does not wish for feeding tubes, ? Hospice   renal stabilized  Adjust fluids  CXR ? RUL pneumonia  Urine C&S negative so far  Renal US , chronic medical disease, no hydronephrosis  Zosyn and Levaquin  Monitor renal function  Monitor INR, hold coumadin  PT/OT shanika Schumacher MD  5/15/2017,

## 2017-05-15 NOTE — PROGRESS NOTES
Patient completed ROM as follows. RUE X 10 wrist flex extension /elbow flex extension /shoulder  Flex extension  LUE X 10 wrist flex extension /elbow flex extension shoulder flex extension    RLE X 0    LLE X 0        Pt participation was:    ( ) AROM/ AAROM    (x )  PROM    Pain level before FMP: unable to verbaize       Pain level after FMP: unable to verbaize   Nonverbal Pain Scale:       ( x   ) Alerted Nursing.    ( x   ) Call bell within patient reach. ( x   ) Pt resting in no apparent distress in bed.           Dominick Reyes, Rehab Tech

## 2017-05-15 NOTE — PROGRESS NOTES
Chart reviewed. Plan remains SNF/TCC VS home with ? Hospice depending on progress. Will cont to follow for further needs. Jocelyne Howell RN,ext. [phone].

## 2017-05-15 NOTE — PROGRESS NOTES
Orders received and chart reviewed patient currently eating lunch with family. Will attempt later today as time permits.

## 2017-05-15 NOTE — PROGRESS NOTES
1955-Received bedside Report from [facility], pt in bed asleep and arousal. Call bell within reach, will continue to monitor    2130-Pts family at bedside. Call bell within reach    0024. eyr drop administered. Pt easily arousal but falls back to sleep. Call bell within reach, will continue to monitor    0300-Sleep asleep. Call bell within reach      Bedside and Verbal shift change report given to Nidia (oncoming nurse) by Angeline Emerson RN (offgoing nurse). Report included the following information SBAR, Kardex, Intake/Output, MAR and Recent Results.

## 2017-05-16 ENCOUNTER — APPOINTMENT (OUTPATIENT)
Dept: CT IMAGING | Age: 82
DRG: 682 | End: 2017-05-16
Attending: INTERNAL MEDICINE
Payer: MEDICARE

## 2017-05-16 LAB
INR PPP: 4.1 (ref 0.8–1.2)
PROTHROMBIN TIME: 37.3 SEC (ref 11.5–15.2)

## 2017-05-16 PROCEDURE — 85610 PROTHROMBIN TIME: CPT | Performed by: INTERNAL MEDICINE

## 2017-05-16 PROCEDURE — 74011250636 HC RX REV CODE- 250/636: Performed by: INTERNAL MEDICINE

## 2017-05-16 PROCEDURE — 77030020253 HC SOL INJ D545NS .05 DEX .45 SAL

## 2017-05-16 PROCEDURE — 74011250637 HC RX REV CODE- 250/637: Performed by: INTERNAL MEDICINE

## 2017-05-16 PROCEDURE — 74011000258 HC RX REV CODE- 258: Performed by: INTERNAL MEDICINE

## 2017-05-16 PROCEDURE — 70450 CT HEAD/BRAIN W/O DYE: CPT

## 2017-05-16 PROCEDURE — 65270000029 HC RM PRIVATE

## 2017-05-16 PROCEDURE — 36415 COLL VENOUS BLD VENIPUNCTURE: CPT | Performed by: INTERNAL MEDICINE

## 2017-05-16 PROCEDURE — 77030020186 HC BOOT HL PROTCT SAGE -B

## 2017-05-16 RX ORDER — MEMANTINE HYDROCHLORIDE 5 MG/1
5 TABLET ORAL DAILY
Status: DISCONTINUED | OUTPATIENT
Start: 2017-05-17 | End: 2017-05-17

## 2017-05-16 RX ORDER — LEVOFLOXACIN 250 MG/1
250 TABLET ORAL EVERY 24 HOURS
Status: DISCONTINUED | OUTPATIENT
Start: 2017-05-16 | End: 2017-05-17

## 2017-05-16 RX ADMIN — LEVOFLOXACIN 250 MG: 250 TABLET, FILM COATED ORAL at 14:59

## 2017-05-16 RX ADMIN — DEXTROSE MONOHYDRATE AND SODIUM CHLORIDE 50 ML/HR: 5; .45 INJECTION, SOLUTION INTRAVENOUS at 03:52

## 2017-05-16 RX ADMIN — DOCUSATE SODIUM 100 MG: 100 CAPSULE, LIQUID FILLED ORAL at 17:06

## 2017-05-16 RX ADMIN — METHYLPREDNISOLONE SODIUM SUCCINATE 40 MG: 40 INJECTION, POWDER, FOR SOLUTION INTRAMUSCULAR; INTRAVENOUS at 14:59

## 2017-05-16 RX ADMIN — PANTOPRAZOLE SODIUM 40 MG: 40 TABLET, DELAYED RELEASE ORAL at 08:51

## 2017-05-16 RX ADMIN — POLYETHYLENE GLYCOL 3350 17 G: 17 POWDER, FOR SOLUTION ORAL at 08:52

## 2017-05-16 RX ADMIN — PIPERACILLIN SODIUM,TAZOBACTAM SODIUM 3.38 G: 3; .375 INJECTION, POWDER, FOR SOLUTION INTRAVENOUS at 08:50

## 2017-05-16 RX ADMIN — PIPERACILLIN SODIUM,TAZOBACTAM SODIUM 3.38 G: 3; .375 INJECTION, POWDER, FOR SOLUTION INTRAVENOUS at 17:05

## 2017-05-16 RX ADMIN — ASPIRIN 81 MG CHEWABLE TABLET 81 MG: 81 TABLET CHEWABLE at 08:51

## 2017-05-16 RX ADMIN — CARBOXYMETHYLCELLULOSE SODIUM 1 DROP: 5 SOLUTION/ DROPS OPHTHALMIC at 09:17

## 2017-05-16 RX ADMIN — DOCUSATE SODIUM 100 MG: 100 CAPSULE, LIQUID FILLED ORAL at 08:52

## 2017-05-16 RX ADMIN — METHYLPREDNISOLONE SODIUM SUCCINATE 40 MG: 40 INJECTION, POWDER, FOR SOLUTION INTRAMUSCULAR; INTRAVENOUS at 06:09

## 2017-05-16 RX ADMIN — BRIMONIDINE TARTRATE 1 DROP: 1 SOLUTION/ DROPS OPHTHALMIC at 09:17

## 2017-05-16 NOTE — ROUTINE PROCESS
Assumed care of patient - disoriented - unable to answer questions - no signs/symptoms of pain - IV infusing, condom cath intact.     0900 - daughter at bedside - assisting with meds and feeding patient    1100 - to CT via bed     1130 - back to room - family at bedside

## 2017-05-16 NOTE — PROGRESS NOTES
General Internal Medicine/Geriatrics    Patient: Janette Joseph MRN: [id number]  CSN: [id number]    YOB: 1923  Age: 80 y.o. Sex: male    DOA: 5/10/2017 LOS:  LOS: 6 days                    Subjective:     Awake, swallowing OK  Non-verbal  Not back to baseline  No cough   pain with leg and arm movement    Review of Systems:  Eyes: negative  Ears, Nose, Mouth, Throat, and Face: negative  Respiratory: negative for dyspnea on exertion  Cardiovascular: negative for chest pain  Gastrointestinal: negative for nausea, vomiting and diarrhea  Genitourinary:negative for dysuria and hematuria  Integument/Breast: negative  Hematologic/Lymphatic: negative for bleeding  Musculoskeletal:negative for arthralgias  Neurological: negative for weakness  Behavioral/Psychiatric: negative for behavior problems  Endocrine: negative for temperature intolerance  Allergic/Immunologic: negative for hay fever    Objective:     Physical Exam:  Patient Vitals for the past 24 hrs:   Temp Pulse Resp BP SpO2   05/16/17 0750 99.1 °F (37.3 °C) 71 18 (!) 182/94 92 %   05/16/17 0320 98 °F (36.7 °C) 69 18 160/79 95 %   05/15/17 2317 98.5 °F (36.9 °C) 69 18 150/83 92 %   05/15/17 1757 98.5 °F (36.9 °C) 70 18 121/79 97 %   05/15/17 1456 99.6 °F (37.6 °C) 69 18 127/77 100 %   05/15/17 1016 99.5 °F (37.5 °C) 69 16 148/85 96 %     Temp up  BP up some    HEENT: wnl  NECK:  No venous distention or adenopathy   HEART: RRR, no rubs or gallops  LUNGS: Clear to auscultation  ABDOMEN: soft with active BS. No tenderness, no distention, no organomegaly  EXT: tender swollen Lt.> Rt. Knee  SKIN: Normal turgor, no breaks  NEURO: MS as above, non focal, ?  Aphasia  Not following commands    Intake and Output:  Current Shift:     Last three shifts:  05/14 1901 - 05/16 0700  In: 1740 [I.V.:1550]  Out: 300 [Urine:300]    Recent Results (from the past 24 hour(s))   PROTHROMBIN TIME + INR    Collection Time: 05/16/17  4:45 AM   Result Value Ref Range Prothrombin time 37.3 (H) 11.5 - 15.2 sec    INR 4.1 (H) 0.8 - 1.2             Current Facility-Administered Medications   Medication Dose Route Frequency    levoFLOXacin (LEVAQUIN) tablet 250 mg  250 mg Oral Q24H    methylPREDNISolone (PF) (SOLU-MEDROL) injection 40 mg  40 mg IntraVENous Q8H    acetaminophen (TYLENOL) suppository 650 mg  650 mg Rectal Q4H PRN    dextrose 5 % - 0.45% NaCl infusion  50 mL/hr IntraVENous CONTINUOUS    aspirin chewable tablet 81 mg  81 mg Oral DAILY    brimonidine (ALPHAGAN P) 0.1 % ophthalmic solution 1 Drop  1 Drop Left Eye Q12H    piperacillin-tazobactam (ZOSYN) 3.375 g in 0.9% sodium chloride (MBP/ADV) 100 mL MBP EXTENDED 4 HOUR INFUSION###  3.375 g IntraVENous Q8H    terazosin (HYTRIN) capsule 10 mg  10 mg Oral QHS    docusate sodium (COLACE) capsule 100 mg  100 mg Oral BID    polyethylene glycol (MIRALAX) packet 17 g  17 g Oral DAILY    pantoprazole (PROTONIX) tablet 40 mg  40 mg Oral ACB    carboxymethylcellulose sodium (CELLUVISC) 0.5 % ophthalmic solution 1 Drop  1 Drop Right Eye DAILY       Lab Results   Component Value Date/Time    Glucose 104 05/14/2017 05:09 AM    Glucose 91 05/13/2017 06:13 AM    Glucose 101 05/12/2017 06:15 AM    Glucose 97 05/11/2017 07:45 AM    Glucose 109 05/10/2017 03:30 PM        Assessment     Active Problems:    UTI (urinary tract infection) (5/10/2017)      Pneumonia (5/11/2017)      VINCENT (acute kidney injury) ([facility] [address] (8/85/5993)      Metabolic encephalopathy (0/75/0719)      SSS (sick sinus syndrome) ([facility] [address] (5/11/2017)      Dehydration (5/11/2017)        Plan     Repeat CT head, ? aphasia   Ammonia, ABG OK  . neurology consult pending  Joint pain/gout better with steroids  Dtr. Does not wish for feeding tubes, ? Hospice   renal stabilized  DC  fluids  CXR ? RUL pneumonia  Urine C&S negative so far  Renal US , chronic medical disease, no hydronephrosis  Zosyn and Levaquin  Monitor renal function  Monitor INR, hold coumadin  PT/OT shanika Fuentes MD  5/16/2017,

## 2017-05-16 NOTE — PROGRESS NOTES
Patient is unable to communicate at this time.  offered prayer and left Spiritual Care brochure. Chaplains will continue to follow and will provide pastoral care on an as needed/requested basis.     [facility]   Staff [facility]   [phone]

## 2017-05-16 NOTE — ROUTINE PROCESS
Bedside and Verbal shift change report given to Sohail Aguayo RN (oncoming nurse) by Alexis Flores RN (offgoing nurse). Report included the following information SBAR, Kardex and MAR.

## 2017-05-16 NOTE — PROGRESS NOTES
1922  Received bedside verbal shift report. Pt lying in bed family at bedside. Piv#  20 to right arm intact. Call bell within reach side rails up x 3, bed low and locked. No signs of distress noted, will continue to monitor. 0430  Bath given at this time. New gown, bed pad and linens placed. Reassessment completed.      [id number] Bedside shift change report given to marvin huffman rn (oncoming nurse) by Louis Boone rn  (offgoing nurse).  Report included the following information SBAR, Kardex, Intake/Output, MAR and Recent Results

## 2017-05-16 NOTE — PROGRESS NOTES
[facility]: The pharmacist has determined that this patient meets P & T approved criteria for conversion from IV to oral therapy for the following medication: Levaquin    Levaquin 250mg IV q24h was discontinued and Levaquin 250mg PO q24h was started. If the patient no longer meets all criteria for oral therapy, the pharmacist will switch back to IV therapy.      Thanks,

## 2017-05-16 NOTE — PROGRESS NOTES
Patient completed ROM as follows. RUE X 10 elbow flex extension/ wrist flex extension/shoulder flex extension    LUE X 10 elbow flex extension/ wrist flex extension /shoulder flex extension    RLE X 0    LLE X 0        Pt participation was:    ( ) AROM/ AAROM    ( x)  PROM    Pain level before FMP:  Unable to verbaize   Pain level after FMP: unable to verbaize  Nonverbal Pain Scale:     (  x  ) Alerted Nursing.  (  x  ) Call bell within patient reach. (  x  ) Pt resting in no apparent distress in bed.           Kaylan Prasad, Rehab Tech

## 2017-05-16 NOTE — ROUTINE PROCESS
Bedside and Verbal shift change report given to Niurka Yo RN (oncoming nurse) by Mohsen Rubi RN (offgoing nurse). Report included the following information SBAR, Kardex and MAR. Patient had no acute events overnight. Currently resting in NAD. No express needs reported at this time.

## 2017-05-16 NOTE — PROGRESS NOTES
Patient completed ROM as follows. RUE X 0    LUE X 0    RLE X 10 heel slides flex extension / ankle pumps flex extension/ ab adduction flex extension  LLE X 10 heel slides flex extension /ankle pumps flex extension/ab/ adduction flex extension        Pt participation was:    ( ) AROM/ AAROM    (x )  PROM    Pain level before FMP: unable to verbaize  Pain level after FMP:unable to verbaize  Nonverbal Pain Scale      (  x  ) Alerted Nursing.  (  x  ) Call bell within patient reach. (  x  ) Pt resting in no apparent distress in bed.           [facility], [facility]

## 2017-05-16 NOTE — MANAGEMENT PLAN
[facility] is not accepting at this time due to very low level of function. Pt is 100% assist with bed mobility. SNF rehab placement will be difficult.  May be Hospice candidate per Dr Mata Last note      Del Jo RN BSN  Outcomes Manager  Pager # [phone]

## 2017-05-17 LAB
INR PPP: 4.6 (ref 0.8–1.2)
PROTHROMBIN TIME: 41 SEC (ref 11.5–15.2)

## 2017-05-17 PROCEDURE — 65270000029 HC RM PRIVATE

## 2017-05-17 PROCEDURE — 77030020250 HC SOL INJ D 5% LFCR 1000ML BG LF

## 2017-05-17 PROCEDURE — 85610 PROTHROMBIN TIME: CPT | Performed by: INTERNAL MEDICINE

## 2017-05-17 PROCEDURE — 74011000258 HC RX REV CODE- 258: Performed by: INTERNAL MEDICINE

## 2017-05-17 PROCEDURE — 74011250636 HC RX REV CODE- 250/636: Performed by: INTERNAL MEDICINE

## 2017-05-17 PROCEDURE — 36415 COLL VENOUS BLD VENIPUNCTURE: CPT | Performed by: INTERNAL MEDICINE

## 2017-05-17 PROCEDURE — 74011250637 HC RX REV CODE- 250/637: Performed by: INTERNAL MEDICINE

## 2017-05-17 RX ORDER — LORAZEPAM 2 MG/ML
0.5 CONCENTRATE ORAL
Status: DISCONTINUED | OUTPATIENT
Start: 2017-05-17 | End: 2017-05-18 | Stop reason: HOSPADM

## 2017-05-17 RX ORDER — DEXTROSE MONOHYDRATE 50 MG/ML
25 INJECTION, SOLUTION INTRAVENOUS CONTINUOUS
Status: DISCONTINUED | OUTPATIENT
Start: 2017-05-17 | End: 2017-05-18 | Stop reason: HOSPADM

## 2017-05-17 RX ORDER — MORPHINE SULFATE 20 MG/ML
10 SOLUTION ORAL
Status: DISCONTINUED | OUTPATIENT
Start: 2017-05-17 | End: 2017-05-18 | Stop reason: HOSPADM

## 2017-05-17 RX ADMIN — METHYLPREDNISOLONE SODIUM SUCCINATE 40 MG: 40 INJECTION, POWDER, FOR SOLUTION INTRAMUSCULAR; INTRAVENOUS at 00:01

## 2017-05-17 RX ADMIN — TERAZOSIN HYDROCHLORIDE 10 MG: 5 CAPSULE ORAL at 00:04

## 2017-05-17 RX ADMIN — BRIMONIDINE TARTRATE 1 DROP: 1 SOLUTION/ DROPS OPHTHALMIC at 22:17

## 2017-05-17 RX ADMIN — BRIMONIDINE TARTRATE 1 DROP: 1 SOLUTION/ DROPS OPHTHALMIC at 00:18

## 2017-05-17 RX ADMIN — METHYLPREDNISOLONE SODIUM SUCCINATE 40 MG: 40 INJECTION, POWDER, FOR SOLUTION INTRAMUSCULAR; INTRAVENOUS at 05:28

## 2017-05-17 RX ADMIN — PIPERACILLIN SODIUM,TAZOBACTAM SODIUM 3.38 G: 3; .375 INJECTION, POWDER, FOR SOLUTION INTRAVENOUS at 00:03

## 2017-05-17 NOTE — ROUTINE PROCESS
0730 - Assumed care of patient - sleeping, arousable but nonverbal - no signs or symptoms of pain - no family present yet. 0830 - family here and Dr. Ritesh Pandya at bedside - DNR and new orders noted.   D5 started at 25 ml/hr

## 2017-05-17 NOTE — PROGRESS NOTES
He has difficulty swallow and all of his oral medication are held today. Minimal response. Per his nurse today, he currently is comfort care only. So I will sign off, please call for questions.      Phyllis Brooke MD

## 2017-05-17 NOTE — PROGRESS NOTES
0930- Assumed care from [address] [facility]. Patient had a bowel movement and getting cleaned by CNA. No acute distress noted IVF d5w infusing at 25ml/hr, will cont. To monitor. 1320- Significant other at bedside visiting, no change in patient status. 1730- Patient ate few bits of apple sauce from significant other. 1930-Bedside and Verbal shift change report given to Srinath Lang (oncoming nurse) by Chichi Wilkins RN (offgoing nurse). Report included the following information SBAR, Kardex, ED Summary, [facility] and Recent Results.

## 2017-05-17 NOTE — PROGRESS NOTES
General Internal Medicine/Geriatrics    Patient: Simone Goode MRN: [id number]  CSN: [id number]    YOB: 1923  Age: 80 y.o. Sex: male    DOA: 5/10/2017 LOS:  LOS: 7 days                    Subjective:     Minimally responsive  Non-verbal  Very poor intake    Review of Systems:      unobtainable    Objective:     Physical Exam:  Patient Vitals for the past 24 hrs:   Temp Pulse Resp BP SpO2   05/17/17 1824 98.9 °F (37.2 °C) 89 16 131/75 93 %   05/17/17 1325 98.8 °F (37.1 °C) 73 18 158/88 98 %   05/17/17 1043 98.6 °F (37 °C) 71 20 154/83 98 %   05/17/17 0548 99.5 °F (37.5 °C) 70 20 170/75 97 %   05/17/17 0322 99.7 °F (37.6 °C) 71 18 167/89 98 %   05/16/17 2351 99.1 °F (37.3 °C) (!) 101 18 160/86 98 %     Temp ok  BP up some    HEENT: wnl  NECK:  No venous distention or adenopathy   HEART: RRR, no rubs or gallops  LUNGS: Clear to auscultation  ABDOMEN: soft with active BS. No tenderness, no distention, no organomegaly  EXT: tender swollen Lt.> Rt.  Knee  SKIN: Normal turgor, no breaks  NEURO: as  above    Intake and Output:  Current Shift:     Last three shifts:  05/16 0701 - 05/17 1900  In: 350 [P.O.:50; I.V.:300]  Out: 600 [Urine:600]    Recent Results (from the past 24 hour(s))   PROTHROMBIN TIME + INR    Collection Time: 05/17/17  4:40 AM   Result Value Ref Range    Prothrombin time 41.0 (H) 11.5 - 15.2 sec    INR 4.6 (H) 0.8 - 1.2             Current Facility-Administered Medications   Medication Dose Route Frequency    dextrose 5% infusion  25 mL/hr IntraVENous CONTINUOUS    LORazepam (INTENSOL) 2 mg/mL oral concentrate 0.5 mg  0.5 mg SubLINGual Q4H PRN    morphine (ROXANOL) 100 mg/5 mL (20 mg/mL) concentrated solution 10 mg  10 mg Oral Q4H PRN    acetaminophen (TYLENOL) suppository 650 mg  650 mg Rectal Q4H PRN    brimonidine (ALPHAGAN P) 0.1 % ophthalmic solution 1 Drop  1 Drop Left Eye Q12H    carboxymethylcellulose sodium (CELLUVISC) 0.5 % ophthalmic solution 1 Drop  1 Drop Right Eye DAILY       Lab Results   Component Value Date/Time    Glucose 104 05/14/2017 05:09 AM    Glucose 91 05/13/2017 06:13 AM    Glucose 101 05/12/2017 06:15 AM    Glucose 97 05/11/2017 07:45 AM    Glucose 109 05/10/2017 03:30 PM        Assessment     Active Problems:    UTI (urinary tract infection) (5/10/2017)      Pneumonia (5/11/2017)      VINCENT (acute kidney injury) ([facility] [address] (4/10/5796)      Metabolic encephalopathy (9/05/6657)      SSS (sick sinus syndrome) ([facility] (5/11/2017)      Dehydration (5/11/2017)        Plan     Repeat CT head, nothing acute   Ammonia slightly up  , ABG OK  . neurology consult noted  Joint pain/gout better with steroids  Dtr. Does not wish for feeding tubes, requests Hospice in a facility, DNR   renal stabilized  Low rate  fluids  CXR ? RUL pneumonia  Urine C&S negative so far  Renal US , chronic medical disease, no hydronephrosis  Comfort measures only        Bibiana Mehta MD  5/17/2017,

## 2017-05-17 NOTE — CONSULTS
Otilio Gordillo    Name:  Celia Butler  MR#:  [id number]  :  1923  Account #:  [de-identified]  Date of Adm:  05/10/2017  Date of Consultation:  2017      REFERRING PHYSICIAN: Kymberly Seymour MD    REASON FOR CONSULTATION: Mental status change. HISTORY OF PRESENT ILLNESS: This is a 20-year-old   American man who has multiple medical conditions including  dementia, paroxysmal atrial fibrillation with cardiac pacemaker,  anticoagulation. He was brought into the emergency room because of  mental status change. At his baseline, he was able to say simple  words and feed himself even though he is bedbound mostly. He has  dementia and currently lives with his daughter at home. He has not  been eating well for the last couple weeks, and becomes minimally  responsive at home. Therefore, he was brought to the emergency room  for further evaluation. In the ED, he was found to have dehydration and  acute renal insufficiency, as well as urinary tract infection. After he was  admitted, he has received gentle hydration and antibiotics for the  infection. The mental status; however, has not been improving much. The patient is nonverbal currently and the history is obtained from  reviewing the chart and discussing with the patient's long time [employer]  friends at bedside. PAST MEDICAL HISTORY: Coronary artery disease, status post  CABG, sick sinus syndrome, status post a pacemaker, paroxysmal  atrial fibrillation on anticoagulation, vitamin B12 deficiency, currently  getting B12 supplement injections. Hyperlipidemia, osteoarthritis, gout,  chronic thrombocytopenia, mild chronic anemia, BPH, GERD, history  of gastric ulcer. CURRENT MEDICATIONS  1. B12.  2. Lasix. 3. Aldactone. 4. Coumadin. 5. Hytrin. 6. Baby aspirin. 7. Norco.  8. Colchicine. 9. Prilosec. 10. Eye drops. SOCIAL HISTORY: He is  and both he and his wife live with  daughter.     FAMILY HISTORY: Positive for hypertension. REVIEW OF SYSTEMS: Not able to obtain from the patient currently. PHYSICAL EXAMINATION  VITAL SIGNS: Temperature 97.6, pulse 70, respiratory rate 20, blood  pressure 137/85. O2 saturation 97%. NEUROLOGIC: This is a well-developed elderly man, who is lying in  the bed. He does not respond to questions, and cannot carry the  conversation. He does not follow commands. He fights against passive  movement of his arms and legs. He also does not follow my commands  to open his eyes. He, however, is able to move his arms and legs  against gravity with no significant asymmetry. He withdraws to touch of  his bilateral arms and legs. The left eye has 2 mm pupils, round and  reactive to light. 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 The left eye responds to visual threat with blink. His  face appears symmetric bilaterally. The extremities move  spontaneously and withdraw to stimulation. HEART: Regular rhythm, and no edema in the ankles. LABORATORY DATA: He was found to have a urinary tract infection  with 3+ bacteria and elevation of creatinine upon admission at 1.9. His  head CT, however, did not demonstrate acute intracranial abnormality. ASSESSMENT AND PLAN  1. Encephalopathy, mostly metabolic in nature due to his acute renal  insufficiency and urinary tract infection. Presently, he also has elevated  ammonia, which also can contribute to his decreased responsiveness. 2. Underlying dementia, most likely senile dementia. However, the  metabolic abnormalities would exacerbate his mental status. We  suggest starting Namenda due to his dementia, which could improve  his agitation. 3. B12 deficiency. Continue his current supplement. 4. Paroxysmal atrial fibrillation. Currently, his INR is supratherapeutic. The head CT repeated today was negative for intracranial hemorrhage. 5. Supportive care and DVT, GI prophylaxis therapy.     Thank you very much for this consultation.         MD Pranay Ramírez / Xavi.Hamman  D:  05/16/2017   16:45  T:  05/16/2017   22:29  Job #:  [id number]

## 2017-05-18 VITALS
HEIGHT: 72 IN | HEART RATE: 70 BPM | SYSTOLIC BLOOD PRESSURE: 149 MMHG | BODY MASS INDEX: 24.38 KG/M2 | RESPIRATION RATE: 20 BRPM | WEIGHT: 180 LBS | OXYGEN SATURATION: 100 % | DIASTOLIC BLOOD PRESSURE: 83 MMHG | TEMPERATURE: 99.8 F

## 2017-05-18 RX ORDER — DEXTROSE MONOHYDRATE 50 MG/ML
25 INJECTION, SOLUTION INTRAVENOUS CONTINUOUS
Qty: 500 ML | Refills: 0 | Status: SHIPPED
Start: 2017-05-18 | End: 2017-05-20

## 2017-05-18 RX ORDER — MORPHINE SULFATE 20 MG/ML
10 SOLUTION ORAL
Qty: 30 ML | Refills: 0 | Status: SHIPPED | OUTPATIENT
Start: 2017-05-18

## 2017-05-18 RX ORDER — LORAZEPAM 2 MG/ML
0.5 CONCENTRATE ORAL
Qty: 30 ML | Refills: 0 | Status: SHIPPED | OUTPATIENT
Start: 2017-05-18

## 2017-05-18 RX ORDER — ACETAMINOPHEN 650 MG/1
650 SUPPOSITORY RECTAL
Qty: 30 SUPPOSITORY | Refills: 0 | Status: SHIPPED
Start: 2017-05-18

## 2017-05-18 RX ADMIN — BRIMONIDINE TARTRATE 1 DROP: 1 SOLUTION/ DROPS OPHTHALMIC at 09:00

## 2017-05-18 RX ADMIN — CARBOXYMETHYLCELLULOSE SODIUM 1 DROP: 5 SOLUTION/ DROPS OPHTHALMIC at 08:53

## 2017-05-18 NOTE — PROGRESS NOTES
Hospital to SNF SBAR Handoff - Rekha Mandujano                                                                        80 y.o.   male    [facility]   Room: 3025/01    [facility] 3S CARDIAC TELE  Unit Phone# :  [phone] [place] [address]  Dept: Damaris [place]: [phone]                    SITUATION     Admitted:  5/10/2017         Attending Provider:  Carlos Obando MD       Consultations:  None    PCP:  Carlos Obando MD   [phone]    Treatment Team: Attending Provider: Carlos Obando MD; Care Manager: Meg Chance RN; Utilization Review: Dennis Tello; Consulting Provider: Paz Gentile MD    Admitting Dx:  UTI (urinary tract infection)       Principal Problem: <principal problem not specified>    * No surgery found * of      BY: * Surgery not found *             ON: * No surgery found *                  Code Status: DNR                Advance Directives:   Advance Care Planning 5/10/2017   Patient's Healthcare Decision Maker is: Verbal statement (Legal Next of Kin remains as decision maker)   Primary Decision Maker Name Davey Fishman   Primary Decision Maker Phone Number [phone]   Primary Decision Maker Relationship to Patient Adult child   Secondary Decision Maker Name Albert Roe Phone Number [phone]   Secondary Decision Maker Relationship to Patient Adult child   Confirm Advance Directive Yes, not on file   Does the patient have other document types Power of     (Send w/patient)   Not Received       Isolation:  There are currently no Active Isolations       MDRO: No current active infections    Pain Medications given:  N/A    Last dose: N/A at  N/A    Special Equipment needed: no  Type of equipment:      (Not currently on dialysis)  (Not currently on dialysis)  (Not currently on dialysis)     BACKGROUND     Allergies:   Allergies   Allergen Reactions    Seafood Hives Past Medical History:   Diagnosis Date    Atrial fibrillation (HCC)     Benign prostate hyperplasia     Cellulitis     Lower extremity    Chronic renal insufficiency     [facility]est[facility] failure ([facility]F) ([facility]     GERD (gastroesophageal reflux disease)     Glaucoma     Hypertension     Inguinal hernia     Klebsiella pneumonia (HCC)     Pleural effusion, left     Sepsis ([address]     Venous insufficiency     Lower extremity       Past Surgical History:   Procedure Laterality Date    CARDIAC SURG PROCEDURE UNLIST      HX PACEMAKER         Prescriptions Prior to Admission   Medication Sig    aspirin delayed-release 81 mg tablet Take 81 mg by mouth daily.  ammonium lactate (LAC-HYDRIN) 12 % lotion rub in to affected area well  Indications: DRY SKIN    brimonidine (ALPHAGAN P) 0.1 % ophthalmic solution Administer 1 Drop to left eye every evening.  colchicine 0.6 mg tablet Take 1 Tab by mouth every Monday, Wednesday, Friday.  furosemide (LASIX) 40 mg tablet  1 daily    pantoprazole (PROTONIX) 40 mg tablet Take 1 Tab by mouth daily (with breakfast).  spironolactone (ALDACTONE) 25 mg tablet Take 1 Tab by mouth two (2) times a day. (Patient taking differently: Take 12.5 mg by mouth two (2) times a day.)    terazosin (HYTRIN) 10 mg capsule Take 1 Cap by mouth nightly.  warfarin (COUMADIN) 5 mg tablet Take 0.5 Tabs by mouth daily. (Patient taking differently: Take 0.5 Tabs by mouth five (5) days a week. TAKE COUMADIN 2.5 MG DAILY EXCEPT ON MONDAY AND FRIDAY TAKE COUMADIN 5 MG NEXT PT/INR WILL BE ON TUESDAY 11/29/16 WITH DR. MOHAN)    capsicum oleoresin 0.025 % topical cream Apply  to affected area three (3) times daily.  docusate sodium (COLACE) 100 mg capsule Take 100 mg by mouth two (2) times a day.  carboxymethylcellulose sodium (REFRESH LIQUIGEL) 1 % dlgl Apply 1 Drop to eye daily.  Indications: 1 drop in R eye    HYDROcodone-acetaminophen (NORCO) 5-325 mg per tablet Take 1 Tab by mouth every six (6) hours as needed. Max Daily Amount: 4 Tabs. Hard scripts included in transfer packet no    Vaccinations: There is no immunization history on file for this patient. Readmission Risks:    Known Risks: Comfort measures        The Charlson CoMorbitiy Index tool is an evidenced based tool that has more automatic generated information. The tool looks at many different items such as the age of the patient, how many times they were admitted in the last calendar year, current length of stay in the hospital and their diagnosis. All of these items are pulled automatically from information documented in the chart from various places and will generate a score that predicts whether a patient is at low (less than 13), medium (13-20) or high (21 or greater) risk of being readmitted.         ASSESSMENT                Temp: 99.8 °F (37.7 °C) (05/18/17 0923) Pulse (Heart Rate): 70 (05/18/17 0923)     Resp Rate: 20 (05/18/17 0923)           BP: 149/83 (05/18/17 0923)     O2 Sat (%): 100 % (05/18/17 0923)     Weight: 81.6 kg (180 lb)    Height: 6' (182.9 cm) (05/10/17 1509)       If above not within 1 hour of discharge:    BP:_____  P:____  R:____ T:_____ O2 Sat: ___%  O2: ______    Active Orders   Diet    DIET DENTAL SOFT (SOFT SOLID)         Orientation: only aware of  person     Active Behaviors: None                                   Active Lines/Drains:  (Peg Tube / Mares / CL or S/L?): no    Urinary Status: External catheter     Last BM: Last Bowel Movement Date: 05/18/17     Skin Integrity: Intact             Mobility: Very limited   Weight Bearing Status: NWB (Non Weight Bearing)                Lab Results   Component Value Date/Time    Glucose 104 05/14/2017 05:09 AM    INR 4.6 05/17/2017 04:40 AM    INR 4.1 05/16/2017 04:45 AM    HGB 12.6 05/10/2017 03:30 PM    HGB 10.2 10/31/2016 05:37 AM        RECOMMENDATION     See After Visit Summary (AVS) for:  · Discharge instructions  · After [facility] Plan   · Special equipment needed (entered pre-discharge by Care Management)  · Medication Reconciliation    · Follow up Appointment(s)         Report given/sent by:  Arlene Virgen RN                    Verbal report given to: Yousuf Shannon  FAXED to:  N/A         Estimated discharge time:  5/18/2017 at 1500

## 2017-05-18 NOTE — ROUTINE PROCESS
Care Management Interventions  PCP Verified by CM:  Yes  Palliative Care Consult (Criteria: CHF and RRAT>21): Yes  Mode of Transport at Discharge: BLS  Transition of Care Consult (CM Consult): SNF, Discharge Planning  Current Support Network: Own Home  Confirm Follow Up Transport: Other (see comment)  Plan discussed with Pt/Family/Caregiver: Yes  Freedom of Choice Offered: Yes  Discharge Location  Discharge Placement: Skilled nursing facility [facility] for Hospice)

## 2017-05-18 NOTE — MANAGEMENT PLAN
0905:Spoke with Dr Diane Duggan who states pt has been accepted to [facility] for [address] Pt not matched yet in [place]. Matched to [facility] in [place]. Updated the notes. Left message stating that Dr Diane Duggan had stated pt accepted there. Will set up transport when officially accepted in [address] at [facility] and notified of referral sent in [place]  1130: [facility] has accepted referral.  1200: Called [facility] and set up medical transport. 1st availble is 3pm. Notified RN and [employer]. Call report to [phone]. PCS form at nurses station. Notified family in room.  Son will ride in ambulance with father    [facility] RN BSN  Outcomes Manager  Pager # [phone]

## 2017-05-18 NOTE — PROGRESS NOTES
Assumed care; Pt resting in bed; no distress noted; Pt sleeping, not interactive; No nonverbal indicators of pain; Comfort measures continued; bed in low position; Side rails up x 3; Call light and personal belonging within reach. Will continue to monitor. 1408: Rounding. Pt eyes open. Family at bedside. Will continue to monitor. 1540: Discharged. IV capped; Condom catheter in place.

## 2017-05-18 NOTE — DISCHARGE SUMMARY
[facility] SUMMARY    Name:  Natalie Real  MR#:  [id number]  :  [id number]  Account #:  [de-identified]  Date of Adm:  05/10/2017  Date of Transfer:  2017      DISCHARGE DIAGNOSES:  1. Acute metabolic encephalopathy, multifactorial.  2. Progressive dementia. 3. Acute pneumonia. 4. Pyuria, negative cultures. 5. Sick sinus syndrome. 6. Dehydration - corrected. 7. Acute kidney injury. 8. Chronic kidney disease. 9. History of coronary artery disease, status post coronary artery  bypass graft. 10. Sick sinus syndrome, status post pacemaker placement. 11. Paroxysmal atrial fibrillation, previously on anticoagulation. 12. History of hypercholesterolemia. 13. History of B12 deficiency. 14. History of polyarticular osteoarthritis. 15. History of gout with acute exacerbation during this admission. 16. Chronic stable thrombocytopenia. 17. Gastric ulcer in the past.  18. Mild chronic anemia. 19. Benign prostatic hyperplasia. 20. Gastroesophageal reflux disease. 21. ALLERGIC TO SEAFOOD. DISPOSITION: The patient will be discharged to nursing facility and  plans to enroll in hospice per family's request. The patient is currently a  DNR. Comfort measures only with pleasure feeds, pureed diet. IV  fluids 25 mL an hour only for 2 days, then discontinue after that. That  is per family's request.    REASON FOR ADMISSION: A 80year-old gentleman with the above  problems, admitted with altered mental status, prolonged mental  decline and physical decline for several days prior to admission. The  patient was dehydrated with acute kidney injury, pneumonia and  possible urinary tract infection. For details, see admission history and  physical.    The patient was admitted, initially aggressively treated with IV  antibiotics, IV fluids, etc. CT of the brain showed no acute abnormality. That was repeated several days later, again without any change.   Despite aggressive therapy, the patient's condition did not improve. Neurology consult was obtained, and the feeling was also metabolic  encephalopathy, multifactorial on top of underlying dementia. The  patient's condition did not improve. We had discussion with the family  and they all wished for DNR, comfort care. No feeding tubes, etc.  Arrangements are being made for the patient to go to nursing facility  with hospice.         MD ALISON Wu / AUDRA  D:  05/18/2017   08:52  T:  05/18/2017   09:09  Job #:  [id number]

## 2017-05-18 NOTE — DISCHARGE INSTRUCTIONS
DISCHARGE SUMMARY from Nurse    The following personal items are in your possession at time of discharge:    Dental Appliances: Uppers, Lowers  Visual Aid: Glasses, At bedside     Home Medications: None  Jewelry: None  Clothing: None  Other Valuables: None  Personal Items Sent to Safe: no          PATIENT INSTRUCTIONS:    After general anesthesia or intravenous sedation, for 24 hours or while taking prescription Narcotics:  · Limit your activities  · Do not drive and operate hazardous machinery  · Do not make important personal or business decisions  · Do  not drink alcoholic beverages  · If you have not urinated within 8 hours after discharge, please contact your surgeon on call. What to do at Home:  Recommended activity: Bedrest, or comfort measures only    If you experience any of the following symptoms listed under HOSPICE, please follow up with your PCP. *  Please give a list of your current medications to your Primary Care Provider. *  Please update this list whenever your medications are discontinued, doses are      changed, or new medications (including over-the-counter products) are added. *  Please carry medication information at all times in case of emergency situations. These are general instructions for a healthy lifestyle:    No smoking/ No tobacco products/ Avoid exposure to second hand smoke    Surgeon General's Warning:  Quitting smoking now greatly reduces serious risk to your health. Obesity, smoking, and sedentary lifestyle greatly increases your risk for illness    A healthy diet, regular physical exercise & weight monitoring are important for maintaining a healthy lifestyle    You may be retaining fluid if you have a history of heart failure or if you experience any of the following symptoms:  Weight gain of 3 pounds or more overnight or 5 pounds in a week, increased swelling in our hands or feet or shortness of breath while lying flat in bed.   Please call your doctor as soon as you notice any of these symptoms; do not wait until your next office visit. Recognize signs and symptoms of STROKE:    F-face looks uneven    A-arms unable to move or move unevenly    S-speech slurred or non-existent    T-time-call 911 as soon as signs and symptoms begin-DO NOT go       Back to bed or wait to see if you get better-TIME IS BRAIN. Warning Signs of HEART ATTACK     Call 911 if you have these symptoms:   Chest discomfort. Most heart attacks involve discomfort in the center of the chest that lasts more than a few minutes, or that goes away and comes back. It can feel like uncomfortable pressure, squeezing, fullness, or pain.  Discomfort in other areas of the upper body. Symptoms can include pain or discomfort in one or both arms, the back, neck, jaw, or stomach.  Shortness of breath with or without chest discomfort.  Other signs may include breaking out in a cold sweat, nausea, or lightheadedness. Don't wait more than five minutes to call 911 - MINUTES MATTER! Fast action can save your life. Calling 911 is almost always the fastest way to get lifesaving treatment. Emergency Medical Services staff can begin treatment when they arrive -- up to an hour sooner than if someone gets to the hospital by car. The discharge information has been reviewed with the caregiver. The caregiver verbalized understanding. Discharge medications reviewed with the caregiver and appropriate educational materials and side effects teaching were provided. Patient discharged without removing armband and transfered to another healthcare acute, sub acute , or extended care facility.   Informed of privacy risks if armband lost or stolen

## 2017-05-18 NOTE — PROGRESS NOTES
Bedside shift report received from [facility]: sleeping, responds to voice but minimally interactive/responsive, nonverbal; on room air, no shortness of breath at rest, no behavioral indicators for pain noted; repositioned comfortably; shift assessment done    2030: daughter at bedside; pt repositioned comfortably    2330: sleeping; no apparent distress    0300: sleeping, no behavioral indicators for pain or other discomforts    0500: bath given by cna, incontinent care done; had a bowel movement    0600: sleeping, with regular, nonlabored breathing    0725: Bedside and Verbal shift change report given to Chinmay Montoya RN (oncoming nurse) by Margo Watters RN (offgoing nurse). Report included the following information SBAR, Kardex, Intake/Output and Recent Results.

## 2017-05-20 LAB
BACTERIA SPEC CULT: NORMAL
BACTERIA SPEC CULT: NORMAL
SERVICE CMNT-IMP: NORMAL
SERVICE CMNT-IMP: NORMAL

## 2021-09-21 NOTE — PROGRESS NOTES
Patient completed ROM as follows. RUE X 0    LUE X 0    RLE X 10 ankle pumps  Flex  Extension /  Heel slides flex extension /ab /adduction flex extension    LLE X 10 ankle pumps flex extension/ heel slides flex extension /ab /adduction flex extension         Pt participation was:    ( ) AROM/ AAROM    (x )  PROM    Pain level before FMP:    Unable to verbaize  Pain level after FMP: unable to verbaize  Nonverbal Pain Scale     (  x) Alerted Nursing.  (  x  ) Call bell within patient reach. ( x   ) Pt resting in no apparent distress in bed.           Kaylan Prasad, Rehab Tech Pt messaged.

## 2023-08-14 NOTE — MANAGEMENT PLAN
[facility]   Discharge Planning/ Assessment    Reasons for Intervention:  Raya Rodríguez daughter and son; pt non verbal and unable to be interviewed. Verified demographics listed on face sheet with patient; all information correct. Pt has Medicare A&B and [facility] Secondary Patient stated their PCP is Dr Fred Cheatham who they have seen today. Patient lives in private home with spouse; pt daughter and family live in pt home as well and care for parents. Patient's NOK is Daughter and wife are next of kin: Michaela Baldwin: [phone]/ Shameka Risk: [phone] . Patient dependent with ADLs prior to admission. Son/daughter state pt was using rolling walker and ambulating until 2 weeks ago and has mainly been in bed. They have a private caregiver that family has hired to assist with care as well. Discharge plan: daughter states Dr Fred Cheatham wants pt in rehab and they choose [facility] again soince pt was there last Nov/Dec. Uploaded to [facility] and matched  Nancy Aparicio RN BSN  Outcomes Manager  Pager # [phone]     High Risk Criteria  [x] Yes  []No   Physician Referral  [] Yes  [x]No        Date    Nursing Referral  [] Yes  [x]No        Date    Patient/Family Request  [] Yes  [x]No        Date       Resources:    Medicare  [x] Yes  []No   Medicaid  [] Yes  [x]No   No Resources  [] Yes  [x]No   Private Insurance  [x] Yes  []No    Name/Phone Number    Other  [] Yes  [x]No        (i.e. Workman's Comp)         Prior Services:    Prior Services  [x] Yes  []No   Home Health  [x] Yes  []No   Agency [address] [place] [address]  [x] Yes  []No        Number of Hours ?    Home Care Program  [] Yes  [x]No       Meals on Wheels  [] Yes  [x]No   Office on Aging  [] Yes  [x]No   Transportation Services  [] Yes  [x]No   Nursing Home  [] Yes  [x]No        Nursing Home Name    [address]  [] Yes  [x]No        [address] Name    Other       Information Source:      Information obtained from  [] Patient [] Parent   [] Guardian  [x] Child  [] Spouse   [] Significant Other/Partner   [] Friend      [] EMS    [] Nursing Home Chart          [] Other:   Chart Review  [x] Yes  []No     Family/Support System:    Patient lives with  [] Alone    [x] Spouse   [] Significant Other  [x] Children  [] Caretaker   [] Parent  [] Sibling     [] Other       Other Support System:    Is the patient responsible for care of others  [] Yes  [x]No   Information of person caring for patient on  discharge    Managers financial affairs independently  [] Yes  [x]No   If no, explain:      Status Prior to Admission:    Mental Status  [x] Awake  [x] Alert  [] Oriented  [] Quiet/Calm [] Lethargic/Sedated   [] Disoriented  [] Restless/Anxious  [] Combative   Personal Care  [] Dependent  [] [address]  [x] Requires Assistance   Meal Preparation Ability  [] Independent   [] Standby Assistance   [] Minimal Assistance   [] Moderate Assistance  [] Maximum Assistance     [x] Total Assistance   Chores  [] Independent with Chores   [] N/A Nursing Home Resident   [x] Requires Assistance   Bowel/Bladder  [] Continent  [] Catheter  [] Incontinent  [] Ostomy Self-Care    [] Urine Diversion Self-Care  [] Maximum Assistance     [x] Total Assistance   Number of Persons needed for assistance    DME at home  [] Eloy Tony Baby Pare  [] Onur Flores   [] Commode    [] Bathroom/Grab Bars  [] Hospital Bed  [] Nebulizer  [] Oxygen           [] Raised Toilet Seat  [] Shower Chair  [] Side Rails for Bed   [] Tub Transfer Bench   [x] Ty Samuel  [] Nicholas Max      [] Other:   Vendor      Treatment Presently Receiving:    Current Treatments  [] Chemotherapy  [] Dialysis  [] Insulin  [x] IVAB [x] IVF   [] O2  [] PCA   [] PT   [] RT   [] Tube Feedings   [] Wound Care     Psychosocial Evaluation:    Verbalized Knowledge of Disease Process  [] Patient  []Family   Coping with Disease Process  [] Patient  []Family   Requires Further Counseling Coping with Disease Process  [] Patient  []Family     Identified Projected Needs:    Home Health Aid  [] Yes  [x]No   Transportation  [] Yes  [x]No   Education  [] Yes  [x]No        Specific Education     Financial Counseling  [] Yes  [x]No   Inability to Care for Self/Will Require 24 hour care  [] Yes  [x]No   Pain Management  [] Yes  [x]No   Home Infusion Therapy  [] Yes  [x]No   Oxygen Therapy  [] Yes  [x]No   DME  [] Yes  [x]No   Long Term Care Placement  [] Yes  [x]No   Rehab  [x] Yes  []No   Physical Therapy  [x] Yes  []No   Needs Anticipated At This Time  [x] Yes  []No     Intra-Hospital Referral:    [address] [facility]  [] Yes  [x]No     [] Yes  [x]No   Patient Representative  [] Yes  [x]No   Staff for Teaching Needs  [] Yes  [x]No   Specialty Teaching Needs     Diabetic Educator  [] Yes  [x]No   Referral for Diabetic Educator Needed  [] Yes  [x]No  If Yes, place order for Nutritionist or Diabetic Consult     Tentative Discharge Plan:    Home with No Services  [] Yes  [x]No   Home with Home Health Follow-up  [] Yes  [x]No        If Yes, specify type    Home Care Program  [] Yes  [x]No        If Yes, specify type    Meals on Wheels  [] Yes  [x]No   Office of Aging  [] Yes  [x]No   NHP  [] Yes  [x]No   Return to the Nursing Home  [] Yes  [x]No   Rehab Therapy  [x] Yes  []No   Acute Rehab  [] Yes  [x]No   Subacute Rehab  [] Yes  [x]No   Private Care  [] Yes  [x]No   Substance Abuse Referral  [] Yes  [x]No   Transportation  [] Yes  [x]No   Chore Service  [] Yes  [x]No   Inpatient Hospice  [] Yes  [x]No   OP RT  [] Yes  [x] No   OP Hemo  [] Yes  [x] No   OP PT  [] Yes  [x]No   Support Group  [] Yes  [x]No   Reach to Recovery  [] Yes  [x]No   OP Oncology Clinic  [] Yes  [x]No   Clinic Appointment  [] Yes  [x]No   DME  [] Yes  [x]No   Comments    Name of D/C Planner or  Given to Patient or Family Caron Langston RN BSN  Outcomes Manager  Pager # [phone]   Phone Number         Extension    Date 5/11/2017 Time 1015   If you are discharged home, whom do you designate to participate in your discharge plan and receive any information needed?      Enter name of designee Spouse/daughter/son        Phone # of designee         Address of designee         Updated         Patient refused to designate any           individual Initial (On Arrival)

## 2025-01-22 NOTE — PROGRESS NOTES
Bedside and Verbal shift change report given to Beatriz Santos RN (oncoming nurse) by Nasra Haider RN (offgoing nurse). Report included the following information SBAR, Kardex, Intake/Output and MAR. Call bell in reach. 0900 Shift assessment complete as documented, no visual signs of pain at this time. AM meds crushed and given in applesauce. Colace held, patient unable to swallow. Miralax held because patient is not cooperating enough to swallow the medication. Patients daughter at bedside. 1508 Reassessment complete, family at bedside. Patient is minimally interactive, opens his eyes to painful stimulus. Bedside and Verbal shift change report given to Prabhu Daily RN (oncoming nurse) by Beatriz Santos RN (offgoing nurse). Report included the following information SBAR, Kardex, Intake/Output and MAR. Call bell in reach. Chele Reyes (Physician Assistant)